# Patient Record
Sex: MALE | Race: WHITE
[De-identification: names, ages, dates, MRNs, and addresses within clinical notes are randomized per-mention and may not be internally consistent; named-entity substitution may affect disease eponyms.]

---

## 2017-04-04 ENCOUNTER — HOSPITAL ENCOUNTER (EMERGENCY)
Dept: HOSPITAL 53 - M ED | Age: 52
LOS: 1 days | Discharge: HOME | End: 2017-04-05
Payer: COMMERCIAL

## 2017-04-04 VITALS — WEIGHT: 315 LBS | BODY MASS INDEX: 41.75 KG/M2 | HEIGHT: 73 IN

## 2017-04-04 VITALS — SYSTOLIC BLOOD PRESSURE: 155 MMHG | DIASTOLIC BLOOD PRESSURE: 83 MMHG

## 2017-04-04 DIAGNOSIS — R07.81: Primary | ICD-10-CM

## 2017-04-04 LAB
ANION GAP SERPL CALC-SCNC: 8 MEQ/L (ref 8–16)
BASOPHILS # BLD AUTO: 0 K/MM3 (ref 0–0.2)
BASOPHILS NFR BLD AUTO: 0.7 % (ref 0–1)
BUN SERPL-MCNC: 11 MG/DL (ref 7–18)
CALCIUM SERPL-MCNC: 8.6 MG/DL (ref 8.5–10.1)
CHLORIDE SERPL-SCNC: 102 MEQ/L (ref 98–107)
CO2 SERPL-SCNC: 28 MEQ/L (ref 21–32)
CREAT SERPL-MCNC: 1.12 MG/DL (ref 0.7–1.3)
EOSINOPHIL # BLD AUTO: 0.5 K/MM3 (ref 0–0.5)
EOSINOPHIL NFR BLD AUTO: 7.8 % (ref 0–3)
ERYTHROCYTE [DISTWIDTH] IN BLOOD BY AUTOMATED COUNT: 13.3 % (ref 11.5–14.5)
GFR SERPL CREATININE-BSD FRML MDRD: > 60 ML/MIN/{1.73_M2} (ref 56–?)
GLUCOSE SERPL-MCNC: 157 MG/DL (ref 70–105)
INR PPP: 0.94
LARGE UNSTAINED CELL #: 0.1 K/MM3 (ref 0–0.4)
LARGE UNSTAINED CELL %: 1 % (ref 0–4)
LYMPHOCYTES # BLD AUTO: 1.6 K/MM3 (ref 1.5–4.5)
LYMPHOCYTES NFR BLD AUTO: 24.4 % (ref 24–44)
MCH RBC QN AUTO: 30.2 PG (ref 27–33)
MCHC RBC AUTO-ENTMCNC: 33.8 G/DL (ref 32–36.5)
MCV RBC AUTO: 89.4 FL (ref 80–96)
MONOCYTES # BLD AUTO: 0.4 K/MM3 (ref 0–0.8)
MONOCYTES NFR BLD AUTO: 6.5 % (ref 0–5)
NEUTROPHILS # BLD AUTO: 3.7 K/MM3 (ref 1.8–7.7)
NEUTROPHILS NFR BLD AUTO: 59.6 % (ref 36–66)
PLATELET # BLD AUTO: 208 K/MM3 (ref 150–450)
POTASSIUM SERPL-SCNC: 3.4 MEQ/L (ref 3.5–5.1)
SODIUM SERPL-SCNC: 138 MEQ/L (ref 136–145)
WBC # BLD AUTO: 6.2 K/MM3 (ref 4–10)

## 2017-04-04 PROCEDURE — 85730 THROMBOPLASTIN TIME PARTIAL: CPT

## 2017-04-04 PROCEDURE — 80048 BASIC METABOLIC PNL TOTAL CA: CPT

## 2017-04-04 PROCEDURE — 85025 COMPLETE CBC W/AUTO DIFF WBC: CPT

## 2017-04-04 PROCEDURE — 71275 CT ANGIOGRAPHY CHEST: CPT

## 2017-04-04 PROCEDURE — 96374 THER/PROPH/DIAG INJ IV PUSH: CPT

## 2017-04-04 PROCEDURE — 99284 EMERGENCY DEPT VISIT MOD MDM: CPT

## 2017-04-04 PROCEDURE — 82550 ASSAY OF CK (CPK): CPT

## 2017-04-04 PROCEDURE — 71020: CPT

## 2017-04-04 PROCEDURE — 82553 CREATINE MB FRACTION: CPT

## 2017-04-04 PROCEDURE — 93005 ELECTROCARDIOGRAM TRACING: CPT

## 2017-04-04 PROCEDURE — 85610 PROTHROMBIN TIME: CPT

## 2017-04-04 NOTE — REPUSA
CT angiogram of the chest

Clinical statement: Chest pain and shortness of breath.

Technique: Multiple axial CT images were obtained from the thoracic inlet through the upper abdomen a
fter a bolus administration of nonionic intravenous contrast. Coronal and sagittal reconstructions we
re also obtained.

Comparison: 1/20/2016.

Findings: The pulmonary arteries are well-opacified with contrast, with no intraluminal filling defec
ts to suggest embolism. The thoracic aorta is unremarkable. Thyroid gland is within normal limits. Th
ere is no thoracic lymphadenopathy. There are no pericardial or pleural effusions. The lungs are lalitha
r. Limited imaging of the upper abdomen is unremarkable. There are no suspicious osseous lesions.

Impression: Unremarkable CT examination of the chest. No evidence of pulmonary embolism.

     Electronically signed by RUDY HARDING MD on 04/04/2017 10:09:54 PM ET

## 2017-04-05 NOTE — REP
PA and lateral chest:

 

Comparisons are 04/24/2016 and 01/20/2016.  The study is also compared with the

chest CT for this same date.

 

The lung fields are clear.  The cardiac size is normal

 

The idalmis, mediastinum, and bony thorax are unremarkable.

 

Impression:

 

Negative PA and lateral chest.  There is no interval change.

 

 

Signed by

Gustabo Choi MD 04/05/2017 08:14 A

## 2017-04-05 NOTE — ECGEPIP
Stationary ECG Study

                           [facility] - ED

                                       

                                       Test Date:    2017

Pat Name:     KATJA ARENAS        Department:   

Patient ID:   [id number]                 Room:         -

Gender:       M                        Technician:   de

:          1965               Requested By: ISABELLA PARISI 

Order Number: [id number]     Reading MD:   Michelle Zapata

                                 Measurements

Intervals                              Axis          

Rate:         106                      P:            57

NM:           155                      QRS:          0

QRSD:         94                       T:            36

QT:           337                                    

QTc:          449                                    

                           Interpretive Statements

SINUS TACHYCARDIA

POSSIBLE LEFT ATRIAL ENLARGEMENT

NONSPECIFIC T-WAVE ABNORMALITY

ABNORMAL RHYTHM ECG

INCREASED RATE 16

Electronically Signed On 2017 21:41:52 EDT by Michelle Zapata

## 2017-04-27 ENCOUNTER — HOSPITAL ENCOUNTER (OUTPATIENT)
Dept: HOSPITAL 53 - M LAB | Age: 52
End: 2017-04-27
Attending: NURSE PRACTITIONER
Payer: COMMERCIAL

## 2017-04-27 DIAGNOSIS — I10: Primary | ICD-10-CM

## 2017-04-27 LAB
ALBUMIN SERPL BCG-MCNC: 3.5 GM/DL (ref 3.2–5.2)
ALBUMIN/GLOB SERPL: 1 {RATIO} (ref 1–1.93)
ALP SERPL-CCNC: 67 U/L (ref 45–117)
ALT SERPL W P-5'-P-CCNC: 36 U/L (ref 12–78)
ANION GAP SERPL CALC-SCNC: 8 MEQ/L (ref 8–16)
AST SERPL-CCNC: 19 U/L (ref 15–37)
BILIRUB SERPL-MCNC: 0.7 MG/DL (ref 0.2–1)
BUN SERPL-MCNC: 12 MG/DL (ref 7–18)
CALCIUM SERPL-MCNC: 8.5 MG/DL (ref 8.5–10.1)
CHLORIDE SERPL-SCNC: 105 MEQ/L (ref 98–107)
CHOLEST SERPL-MCNC: 165 MG/DL (ref ?–200)
CO2 SERPL-SCNC: 28 MEQ/L (ref 21–32)
CREAT SERPL-MCNC: 0.94 MG/DL (ref 0.7–1.3)
ERYTHROCYTE [DISTWIDTH] IN BLOOD BY AUTOMATED COUNT: 13.6 % (ref 11.5–14.5)
GFR SERPL CREATININE-BSD FRML MDRD: > 60 ML/MIN/{1.73_M2} (ref 56–?)
GLUCOSE SERPL-MCNC: 148 MG/DL (ref 70–105)
MCH RBC QN AUTO: 29.8 PG (ref 27–33)
MCHC RBC AUTO-ENTMCNC: 33.1 G/DL (ref 32–36.5)
MCV RBC AUTO: 90.1 FL (ref 80–96)
PLATELET # BLD AUTO: 232 K/MM3 (ref 150–450)
POTASSIUM SERPL-SCNC: 4.1 MEQ/L (ref 3.5–5.1)
PROT SERPL-MCNC: 7 GM/DL (ref 6.4–8.2)
SODIUM SERPL-SCNC: 141 MEQ/L (ref 136–145)
TRIGL SERPL-MCNC: 147 MG/DL (ref ?–150)
WBC # BLD AUTO: 8.3 K/MM3 (ref 4–10)

## 2017-08-01 ENCOUNTER — HOSPITAL ENCOUNTER (EMERGENCY)
Dept: HOSPITAL 53 - M ED | Age: 52
Discharge: HOME | End: 2017-08-01
Payer: COMMERCIAL

## 2017-08-01 VITALS — BODY MASS INDEX: 41.75 KG/M2 | WEIGHT: 315 LBS | HEIGHT: 73 IN

## 2017-08-01 VITALS — SYSTOLIC BLOOD PRESSURE: 104 MMHG | DIASTOLIC BLOOD PRESSURE: 66 MMHG

## 2017-08-01 DIAGNOSIS — E78.4: ICD-10-CM

## 2017-08-01 DIAGNOSIS — E11.9: ICD-10-CM

## 2017-08-01 DIAGNOSIS — K62.5: Primary | ICD-10-CM

## 2017-08-01 DIAGNOSIS — I25.10: ICD-10-CM

## 2017-08-01 DIAGNOSIS — I10: ICD-10-CM

## 2017-08-01 DIAGNOSIS — I25.2: ICD-10-CM

## 2017-08-01 LAB
ALBUMIN SERPL BCG-MCNC: 3.9 GM/DL (ref 3.2–5.2)
ALBUMIN/GLOB SERPL: 1.18 {RATIO} (ref 1–1.93)
ALP SERPL-CCNC: 69 U/L (ref 45–117)
ALT SERPL W P-5'-P-CCNC: 28 U/L (ref 12–78)
ANION GAP SERPL CALC-SCNC: 9 MEQ/L (ref 8–16)
AST SERPL-CCNC: 13 U/L (ref 15–37)
BASOPHILS # BLD AUTO: 0.1 K/MM3 (ref 0–0.2)
BASOPHILS NFR BLD AUTO: 0.9 % (ref 0–1)
BILIRUB CONJ SERPL-MCNC: 0.1 MG/DL (ref 0–0.2)
BILIRUB SERPL-MCNC: 0.5 MG/DL (ref 0.2–1)
BUN SERPL-MCNC: 14 MG/DL (ref 7–18)
CALCIUM SERPL-MCNC: 9.3 MG/DL (ref 8.5–10.1)
CHLORIDE SERPL-SCNC: 108 MEQ/L (ref 98–107)
CO2 SERPL-SCNC: 26 MEQ/L (ref 21–32)
CREAT SERPL-MCNC: 1.07 MG/DL (ref 0.7–1.3)
EOSINOPHIL # BLD AUTO: 0.9 K/MM3 (ref 0–0.5)
EOSINOPHIL NFR BLD AUTO: 6.9 % (ref 0–3)
ERYTHROCYTE [DISTWIDTH] IN BLOOD BY AUTOMATED COUNT: 13.7 % (ref 11.5–14.5)
GFR SERPL CREATININE-BSD FRML MDRD: > 60 ML/MIN/{1.73_M2} (ref 56–?)
GLUCOSE SERPL-MCNC: 145 MG/DL (ref 70–105)
INR PPP: 0.94
LARGE UNSTAINED CELL #: 0.2 K/MM3 (ref 0–0.4)
LARGE UNSTAINED CELL %: 1.1 % (ref 0–4)
LYMPHOCYTES # BLD AUTO: 2 K/MM3 (ref 1.5–4.5)
LYMPHOCYTES NFR BLD AUTO: 13.5 % (ref 24–44)
MCH RBC QN AUTO: 30.4 PG (ref 27–33)
MCHC RBC AUTO-ENTMCNC: 33.3 G/DL (ref 32–36.5)
MCV RBC AUTO: 91.3 FL (ref 80–96)
MONOCYTES # BLD AUTO: 0.6 K/MM3 (ref 0–0.8)
MONOCYTES NFR BLD AUTO: 4.1 % (ref 0–5)
NEUTROPHILS # BLD AUTO: 9.9 K/MM3 (ref 1.8–7.7)
NEUTROPHILS NFR BLD AUTO: 73.4 % (ref 36–66)
PLATELET # BLD AUTO: 323 K/MM3 (ref 150–450)
POTASSIUM SERPL-SCNC: 4.4 MEQ/L (ref 3.5–5.1)
PROT SERPL-MCNC: 7.2 GM/DL (ref 6.4–8.2)
SODIUM SERPL-SCNC: 143 MEQ/L (ref 136–145)
WBC # BLD AUTO: 13.4 K/MM3 (ref 4–10)

## 2017-08-01 PROCEDURE — 86900 BLOOD TYPING SEROLOGIC ABO: CPT

## 2017-08-01 PROCEDURE — 80048 BASIC METABOLIC PNL TOTAL CA: CPT

## 2017-08-01 PROCEDURE — 87507 IADNA-DNA/RNA PROBE TQ 12-25: CPT

## 2017-08-01 PROCEDURE — 85610 PROTHROMBIN TIME: CPT

## 2017-08-01 PROCEDURE — 99283 EMERGENCY DEPT VISIT LOW MDM: CPT

## 2017-08-01 PROCEDURE — 81001 URINALYSIS AUTO W/SCOPE: CPT

## 2017-08-01 PROCEDURE — 83605 ASSAY OF LACTIC ACID: CPT

## 2017-08-01 PROCEDURE — 86901 BLOOD TYPING SEROLOGIC RH(D): CPT

## 2017-08-01 PROCEDURE — 83690 ASSAY OF LIPASE: CPT

## 2017-08-01 PROCEDURE — 74177 CT ABD & PELVIS W/CONTRAST: CPT

## 2017-08-01 PROCEDURE — 80076 HEPATIC FUNCTION PANEL: CPT

## 2017-08-01 PROCEDURE — 85025 COMPLETE CBC W/AUTO DIFF WBC: CPT

## 2017-08-01 PROCEDURE — 85730 THROMBOPLASTIN TIME PARTIAL: CPT

## 2017-08-01 PROCEDURE — 86850 RBC ANTIBODY SCREEN: CPT

## 2017-08-01 NOTE — REP
CT ABDOMEN AND PELVIS WITH CONTRAST:

 

HISTORY: Rectal bleeding.

 

CONTRAST: Isovue-370, 100 mL.

 

The liver, gallbladder, pancreas, spleen, adrenal glands and kidneys are normal

in appearance. There is no mass, adenopathy or free fluid. The visualized lungs

are clear.

 

Normal CT abdomen.

 

CT pelvis: The prostate gland and urinary bladder are normal in appearance. There

is no mass, adenopathy or free fluid. A small right inguinal hernia containing

fat is present. Degenerative change is present in the lumbar spine.

 

IMPRESSION:

 

Small right inguinal hernia.

 

 

Signed by

Aristeo Kumari MD 08/01/2017 06:54 P

## 2017-09-10 ENCOUNTER — HOSPITAL ENCOUNTER (EMERGENCY)
Dept: HOSPITAL 53 - M ED | Age: 52
LOS: 1 days | Discharge: HOME | End: 2017-09-11
Payer: COMMERCIAL

## 2017-09-10 VITALS — HEIGHT: 74 IN | WEIGHT: 315 LBS | BODY MASS INDEX: 40.43 KG/M2

## 2017-09-10 DIAGNOSIS — I10: ICD-10-CM

## 2017-09-10 DIAGNOSIS — I25.10: ICD-10-CM

## 2017-09-10 DIAGNOSIS — E78.4: ICD-10-CM

## 2017-09-10 DIAGNOSIS — R07.81: Primary | ICD-10-CM

## 2017-09-10 DIAGNOSIS — Z87.891: ICD-10-CM

## 2017-09-10 LAB
ADD MANUAL DIFFER: YES
ANION GAP SERPL CALC-SCNC: 5 MEQ/L (ref 8–16)
BASO STIPL BLD QL SMEAR: (no result)
BUN SERPL-MCNC: 10 MG/DL (ref 7–18)
CALCIUM SERPL-MCNC: 8.6 MG/DL (ref 8.5–10.1)
CHLORIDE SERPL-SCNC: 106 MEQ/L (ref 98–107)
CO2 SERPL-SCNC: 29 MEQ/L (ref 21–32)
CREAT SERPL-MCNC: 1.07 MG/DL (ref 0.7–1.3)
EOSINOPHIL NFR BLD MANUAL: 5 % (ref 0–5)
ERYTHROCYTE [DISTWIDTH] IN BLOOD BY AUTOMATED COUNT: 13.7 % (ref 11.5–14.5)
GFR SERPL CREATININE-BSD FRML MDRD: > 60 ML/MIN/{1.73_M2} (ref 56–?)
GLUCOSE SERPL-MCNC: 133 MG/DL (ref 70–105)
HYPOCHROMIA BLD QL SMEAR: (no result)
INR PPP: 0.95
MCH RBC QN AUTO: 26.1 PG (ref 27–33)
MCHC RBC AUTO-ENTMCNC: 31.4 G/DL (ref 32–36.5)
MCV RBC AUTO: 83.3 FL (ref 80–96)
NRBC BLD MANUAL-RTO: 1 % (ref 0–0)
PLATELET # BLD AUTO: 334 K/MM3 (ref 150–450)
POLYCHROMASIA BLD QL SMEAR: (no result)
POTASSIUM SERPL-SCNC: 3.8 MEQ/L (ref 3.5–5.1)
SODIUM SERPL-SCNC: 140 MEQ/L (ref 136–145)
WBC # BLD AUTO: 10.1 K/MM3 (ref 4–10)

## 2017-09-10 PROCEDURE — 82553 CREATINE MB FRACTION: CPT

## 2017-09-10 PROCEDURE — 80048 BASIC METABOLIC PNL TOTAL CA: CPT

## 2017-09-10 PROCEDURE — 71010: CPT

## 2017-09-10 PROCEDURE — 93005 ELECTROCARDIOGRAM TRACING: CPT

## 2017-09-10 PROCEDURE — 71275 CT ANGIOGRAPHY CHEST: CPT

## 2017-09-10 PROCEDURE — 99285 EMERGENCY DEPT VISIT HI MDM: CPT

## 2017-09-10 PROCEDURE — 85610 PROTHROMBIN TIME: CPT

## 2017-09-10 PROCEDURE — 85730 THROMBOPLASTIN TIME PARTIAL: CPT

## 2017-09-10 PROCEDURE — 85025 COMPLETE CBC W/AUTO DIFF WBC: CPT

## 2017-09-10 PROCEDURE — 82550 ASSAY OF CK (CPK): CPT

## 2017-09-10 NOTE — REPUSA
CT angiogram of the chest

Clinical statement: Chest pain and shortness of breath.

Technique: Multiple axial CT images were obtained from the thoracic inlet through the upper abdomen a
fter a bolus administration of nonionic intravenous contrast. Coronal and sagittal reconstructions we
re also obtained.

No comparison is available.

Findings: The pulmonary arteries are well-opacified with contrast, with no intraluminal filling defec
ts to suggest embolism. The thoracic aorta is unremarkable. Thyroid gland is within normal limits. Th
ere is no enlarged thoracic lymphadenopathy, although numerous subcentimeter lymph nodes are seen wit
hin the mediastinum. There are no pericardial or pleural effusions. The lungs are clear. Limited imag
ing of the upper abdomen is unremarkable. There are no suspicious osseous lesions.

Impression: Unremarkable CT examination of the chest. No evidence of pulmonary embolism.

     Electronically signed by RUDY HARDING MD on 09/10/2017 10:07:20 PM ET

## 2017-09-11 VITALS — DIASTOLIC BLOOD PRESSURE: 60 MMHG | SYSTOLIC BLOOD PRESSURE: 128 MMHG

## 2017-09-11 NOTE — ECGEPIP
Stationary ECG Study

                           [facility] - ED

                                       

                                       Test Date:    2017-09-10

Pat Name:     KATJA ARENAS        Department:   

Patient ID:   [id number]                 Room:         -

Gender:       M                        Technician:   de

:          1965               Requested By: ISABELLA PARISI 

Order Number: [id number]     Reading MD:   Asa Sellers

                                 Measurements

Intervals                              Axis          

Rate:         86                       P:            61

CO:           159                      QRS:          14

QRSD:         95                       T:            20

QT:           365                                    

QTc:          439                                    

                           Interpretive Statements

SINUS RHYTHM

SIMILAR TO PRIOR ON SAME DATE

Electronically Signed On 2017 19:29:26 EDT by Asa Sellers

## 2017-09-11 NOTE — REP
Clinical:  Chest pain .

 

Comparison:  04/04/2017 .

 

Findings:

The mediastinum and cardiac silhouette are stable and within normal limits for

portable technique.  The lung fields are clear without acute consolidation,

effusion, or pneumothorax.  Skeletal structures are intact.

 

Impression:

No acute cardiopulmonary process appreciated.

 

 

Signed by

Gregory Chua MD 09/11/2017 07:35 A

## 2017-09-11 NOTE — ECGEPIP
Stationary ECG Study

                           [facility] - ED

                                       

                                       Test Date:    2017-09-10

Pat Name:     KATJA ARENAS        Department:   

Patient ID:   [id number]                 Room:         -

Gender:       M                        Technician:   shree

:          1965               Requested By: ISABELLA PARISI 

Order Number: [id number]     Reading MD:   Asa Sellers

                                 Measurements

Intervals                              Axis          

Rate:         101                      P:            57

WV:           155                      QRS:          16

QRSD:         97                       T:            42

QT:           358                                    

QTc:          464                                    

                           Interpretive Statements

SINUS TACHYCARDIA

 

Electronically Signed On 2017 19:26:03 EDT by Asa Sellers

## 2017-12-11 ENCOUNTER — HOSPITAL ENCOUNTER (OUTPATIENT)
Dept: HOSPITAL 53 - M LAB | Age: 52
End: 2017-12-11
Attending: NURSE PRACTITIONER
Payer: COMMERCIAL

## 2017-12-11 DIAGNOSIS — E11.9: Primary | ICD-10-CM

## 2017-12-11 DIAGNOSIS — E78.2: ICD-10-CM

## 2017-12-11 LAB
ALBUMIN SERPL BCG-MCNC: 3.7 GM/DL (ref 3.2–5.2)
ALBUMIN/GLOB SERPL: 1.06 {RATIO} (ref 1–1.93)
ALP SERPL-CCNC: 73 U/L (ref 45–117)
ALT SERPL W P-5'-P-CCNC: 20 U/L (ref 12–78)
ANION GAP SERPL CALC-SCNC: 8 MEQ/L (ref 8–16)
AST SERPL-CCNC: 11 U/L (ref 7–37)
BILIRUB SERPL-MCNC: 0.5 MG/DL (ref 0.2–1)
BUN SERPL-MCNC: 16 MG/DL (ref 7–18)
CALCIUM SERPL-MCNC: 8.1 MG/DL (ref 8.5–10.1)
CHLORIDE SERPL-SCNC: 109 MEQ/L (ref 98–107)
CHOLEST SERPL-MCNC: 151 MG/DL (ref ?–200)
CO2 SERPL-SCNC: 24 MEQ/L (ref 21–32)
CREAT SERPL-MCNC: 1.04 MG/DL (ref 0.7–1.3)
ERYTHROCYTE [DISTWIDTH] IN BLOOD BY AUTOMATED COUNT: 17.7 % (ref 11.5–14.5)
GFR SERPL CREATININE-BSD FRML MDRD: > 60 ML/MIN/{1.73_M2} (ref 56–?)
GLUCOSE SERPL-MCNC: 160 MG/DL (ref 70–105)
MCH RBC QN AUTO: 20.6 PG (ref 27–33)
MCHC RBC AUTO-ENTMCNC: 27.8 G/DL (ref 32–36.5)
MCV RBC AUTO: 74.1 FL (ref 80–96)
NRBC BLD AUTO-RTO: 0 % (ref 0–0)
PLATELET # BLD AUTO: 305 10^3/UL (ref 150–450)
POTASSIUM SERPL-SCNC: 4.2 MEQ/L (ref 3.5–5.1)
PROT SERPL-MCNC: 7.2 GM/DL (ref 6.4–8.2)
SODIUM SERPL-SCNC: 141 MEQ/L (ref 136–145)
TRIGL SERPL-MCNC: 89 MG/DL (ref ?–150)
WBC # BLD AUTO: 8.6 10^3/UL (ref 4–10)

## 2018-08-21 ENCOUNTER — HOSPITAL ENCOUNTER (OUTPATIENT)
Dept: HOSPITAL 53 - M LAB | Age: 53
End: 2018-08-21
Attending: NURSE PRACTITIONER
Payer: COMMERCIAL

## 2018-08-21 DIAGNOSIS — E11.9: ICD-10-CM

## 2018-08-21 DIAGNOSIS — Z12.5: ICD-10-CM

## 2018-08-21 DIAGNOSIS — E78.2: Primary | ICD-10-CM

## 2018-08-21 LAB
ALBUMIN/GLOBULIN RATIO: 0.97 (ref 1–1.93)
ALBUMIN: 3.5 GM/DL (ref 3.2–5.2)
ALKALINE PHOSPHATASE: 62 U/L (ref 45–117)
ALT SERPL W P-5'-P-CCNC: 20 U/L (ref 12–78)
ANION GAP: 7 MEQ/L (ref 8–16)
AST SERPL-CCNC: 10 U/L (ref 7–37)
BILIRUBIN,TOTAL: 0.6 MG/DL (ref 0.2–1)
BLOOD UREA NITROGEN: 14 MG/DL (ref 7–18)
CALCIUM LEVEL: 8.4 MG/DL (ref 8.5–10.1)
CARBON DIOXIDE LEVEL: 27 MEQ/L (ref 21–32)
CHLORIDE LEVEL: 108 MEQ/L (ref 98–107)
CHOLEST SERPL-MCNC: 144 MG/DL (ref ?–200)
CHOLESTEROL RISK RATIO: 2.44 (ref ?–5)
CREATININE FOR GFR: 1 MG/DL (ref 0.7–1.3)
GFR SERPL CREATININE-BSD FRML MDRD: > 60 ML/MIN/{1.73_M2} (ref 56–?)
GLUCOSE, FASTING: 139 MG/DL (ref 70–100)
HDLC SERPL-MCNC: 59 MG/DL (ref 40–?)
HEMATOCRIT: 36.2 % (ref 42–52)
HEMOGLOBIN: 10.3 G/DL (ref 13.5–17.5)
LDL CHOLESTEROL: 67.6 MG/DL (ref ?–100)
MEAN CORPUSCULAR HEMOGLOBIN: 20.2 PG (ref 27–33)
MEAN CORPUSCULAR HGB CONC: 28.5 G/DL (ref 32–36.5)
MEAN CORPUSCULAR VOLUME: 71 FL (ref 80–96)
NONHDLC SERPL-MCNC: 85 MG/DL
NRBC BLD AUTO-RTO: 0 % (ref 0–0)
PLATELET COUNT, AUTOMATED: 293 10^3/UL (ref 150–450)
POTASSIUM SERUM: 4.3 MEQ/L (ref 3.5–5.1)
PROSTATIC SPECIFIC AG MONITOR: 0.38 NG/ML (ref ?–4)
RED BLOOD COUNT: 5.1 10^6/UL (ref 4.3–6.1)
RED CELL DISTRIBUTION WIDTH: 17.4 % (ref 11.5–14.5)
SODIUM LEVEL: 142 MEQ/L (ref 136–145)
TOTAL PROTEIN: 7.1 GM/DL (ref 6.4–8.2)
TRIGLYCERIDES LEVEL: 87 MG/DL (ref ?–150)
WHITE BLOOD COUNT: 8.5 10^3/UL (ref 4–10)

## 2019-01-07 ENCOUNTER — HOSPITAL ENCOUNTER (OUTPATIENT)
Dept: HOSPITAL 53 - M LAB | Age: 54
End: 2019-01-07
Attending: INTERNAL MEDICINE
Payer: COMMERCIAL

## 2019-01-07 DIAGNOSIS — K62.5: Primary | ICD-10-CM

## 2019-01-07 LAB
BASOPHILS # BLD AUTO: 0.1 10^3/UL (ref 0–0.2)
BASOPHILS NFR BLD AUTO: 0.9 % (ref 0–1)
EOSINOPHIL # BLD AUTO: 0.8 10^3/UL (ref 0–0.5)
EOSINOPHIL NFR BLD AUTO: 8.6 % (ref 0–3)
FERRITIN SERPL-MCNC: 23 NG/ML (ref 26–388)
FOLATE SERPL-MCNC: 15.5 NG/ML
HCT VFR BLD AUTO: 45.3 % (ref 42–52)
HGB BLD-MCNC: 14.4 G/DL (ref 13.5–17.5)
IRON SATN MFR SERPL: 70.8 % (ref 19.7–50)
IRON SERPL-MCNC: 284 UG/DL (ref 65–175)
LYMPHOCYTES # BLD AUTO: 1.8 10^3/UL (ref 1.5–4.5)
LYMPHOCYTES NFR BLD AUTO: 21.1 % (ref 24–44)
MCH RBC QN AUTO: 28.4 PG (ref 27–33)
MCHC RBC AUTO-ENTMCNC: 31.8 G/DL (ref 32–36.5)
MCV RBC AUTO: 89.3 FL (ref 80–96)
MONOCYTES # BLD AUTO: 0.6 10^3/UL (ref 0–0.8)
MONOCYTES NFR BLD AUTO: 6.6 % (ref 0–5)
NEUTROPHILS # BLD AUTO: 5.5 10^3/UL (ref 1.8–7.7)
NEUTROPHILS NFR BLD AUTO: 62.6 % (ref 36–66)
PLATELET # BLD AUTO: 275 10^3/UL (ref 150–450)
RBC # BLD AUTO: 5.07 10^6/UL (ref 4.3–6.1)
TIBC SERPL-MCNC: 401 UG/DL (ref 250–450)
VIT B12 SERPL-MCNC: 434 PG/ML
WBC # BLD AUTO: 8.7 10^3/UL (ref 4–10)

## 2019-01-15 ENCOUNTER — HOSPITAL ENCOUNTER (OUTPATIENT)
Dept: HOSPITAL 53 - M OPP | Age: 54
Discharge: HOME | End: 2019-01-15
Attending: INTERNAL MEDICINE
Payer: COMMERCIAL

## 2019-01-15 VITALS — WEIGHT: 315 LBS | BODY MASS INDEX: 41.75 KG/M2 | HEIGHT: 73 IN

## 2019-01-15 VITALS — SYSTOLIC BLOOD PRESSURE: 155 MMHG | DIASTOLIC BLOOD PRESSURE: 96 MMHG

## 2019-01-15 DIAGNOSIS — Z83.3: ICD-10-CM

## 2019-01-15 DIAGNOSIS — G47.30: ICD-10-CM

## 2019-01-15 DIAGNOSIS — D12.3: ICD-10-CM

## 2019-01-15 DIAGNOSIS — Z79.84: ICD-10-CM

## 2019-01-15 DIAGNOSIS — E11.9: ICD-10-CM

## 2019-01-15 DIAGNOSIS — Z82.49: ICD-10-CM

## 2019-01-15 DIAGNOSIS — Z88.8: ICD-10-CM

## 2019-01-15 DIAGNOSIS — D49.0: ICD-10-CM

## 2019-01-15 DIAGNOSIS — D50.9: ICD-10-CM

## 2019-01-15 DIAGNOSIS — K92.1: Primary | ICD-10-CM

## 2019-01-15 DIAGNOSIS — E78.5: ICD-10-CM

## 2019-01-15 DIAGNOSIS — K29.70: ICD-10-CM

## 2019-01-15 DIAGNOSIS — Z79.899: ICD-10-CM

## 2019-01-15 DIAGNOSIS — I10: ICD-10-CM

## 2019-01-15 DIAGNOSIS — Z98.890: ICD-10-CM

## 2019-01-15 PROCEDURE — 43239 EGD BIOPSY SINGLE/MULTIPLE: CPT

## 2019-01-15 PROCEDURE — 45380 COLONOSCOPY AND BIOPSY: CPT

## 2019-01-15 PROCEDURE — 88305 TISSUE EXAM BY PATHOLOGIST: CPT

## 2019-01-15 PROCEDURE — 45385 COLONOSCOPY W/LESION REMOVAL: CPT

## 2019-01-15 NOTE — ROOR
________________________________________________________________________________

Patient Name: Fredi Bose        Procedure Date: 1/15/2019 11:18 AM

MRN: [id number]                          Account Number: [id number]

YOB: 1965               Age: 53

Room: [facility]                            Gender: Male

Note Status: Finalized                 

________________________________________________________________________________

 

Procedure:           Colonoscopy

Indications:         Hematochezia, Iron deficiency anemia

Providers:           Jeremy Flores MD

Referring MD:        ARMIN VELÁZQUEZ MD

Requesting Provider: 

Medicines:           Monitored Anesthesia Care

Complications:       No immediate complications.

________________________________________________________________________________

Procedure:           Pre-Anesthesia Assessment:

                     - Prior to the procedure, a History and Physical was 

                     performed, and patient medications and allergies were 

                     reviewed. The patient is competent. The risks and 

                     benefits of the procedure and the sedation options and 

                     risks were discussed with the patient. All questions were 

                     answered and informed consent was obtained. Patient 

                     identification and proposed procedure were verified by 

                     the physician, the nurse and the anesthesiologist in the 

                     procedure room. Mental Status Examination: alert and 

                     oriented. Airway Examination: normal oropharyngeal airway 

                     and neck mobility. Respiratory Examination: clear to 

                     auscultation. CV Examination: normal. Prophylactic 

                     Antibiotics: The patient does not require prophylactic 

                     antibiotics. Prior Anticoagulants: The patient has taken 

                     no previous anticoagulant or antiplatelet agents. ASA 

                     Grade Assessment: III - A patient with severe systemic 

                     disease. After reviewing the risks and benefits, the 

                     patient was deemed in satisfactory condition to undergo 

                     the procedure. The anesthesia plan was to use monitored 

                     anesthesia care (MAC). Immediately prior to 

                     administration of medications, the patient was 

                     re-assessed for adequacy to receive sedatives. The heart 

                     rate, respiratory rate, oxygen saturations, blood 

                     pressure, adequacy of pulmonary ventilation, and response 

                     to care were monitored throughout the procedure. The 

                     physical status of the patient was re-assessed after the 

                     procedure.

                     The Colonoscope was introduced through the anus and 

                     advanced to the terminal ileum, with identification of 

                     the appendiceal orifice and IC valve. The colonoscopy was 

                     performed without difficulty. The patient tolerated the 

                     procedure well. The quality of the bowel preparation was 

                     good. The terminal ileum, ileocecal valve, appendiceal 

                     orifice, and rectum were photographed. Scope insertion 

                     time was 4 minutes. Scope withdrawal time was 10 minutes. 

                     The total duration of the procedure was 14 minutes.

                                                                                

Findings:

     The perianal and digital rectal examinations were normal.

     The terminal ileum appeared normal.

     A 10 mm polyp was found in the proximal transverse colon. The polyp was 

     pedunculated. The polyp was removed with a hot snare. Resection and 

     retrieval were complete. Verification of patient identification for the 

     specimen was done by the physician and nurse using the patient's name, 

     birth date and medical record number. Estimated blood loss was minimal.

     An infiltrative and ulcerated partially obstructing large mass was found 

     in the distal transverse colon. The mass was circumferential. The mass 

     measured five cm in length. No bleeding was present. This was biopsied 

     with a cold forceps for histology. Area was tattooed with an injection of 

     Magdalena ink.

     No additional abnormalities were found on retroflexion.

                                                                                

Impression:          - The examined portion of the ileum was normal.

                     - One 10 mm polyp in the proximal transverse colon, 

                     removed with a hot snare. Resected and retrieved.

                     - Likely malignant partially obstructing tumor in the 

                     distal transverse colon. Biopsied. Tattooed.

Recommendation:      - Patient has a contact number available for emergencies. 

                     The signs and symptoms of potential delayed complications 

                     were discussed with the patient. Return to normal 

                     activities tomorrow. Written discharge instructions were 

                     provided to the patient.

                     - Soft diet for 1 day, then advance as tolerated to 

                     resume previous diet.

                     - Continue present medications.

                     - Miralax 1 capful (17 grams) in 8 ounces of water PO 

                     daily.

                     - Post-Procedure Resumption of Antiplatelet Medications: 

                     Restart aspirin today 81 mg PO daily.

                     - Resume Plavix (clopidogrel) at prior dose tomorrow. 

                     Refer to primary physician for further adjustment of 

                     therapy.

                     - Repeat colonoscopy in 1 year per protocol.

                     - Based on the biopsy results you will receive a phone 

                     call from GI clinic in 2-3 weeks to review the pathology 

                     results AND/OR your results will be faxed to your Primary 

                     care physician.

                     - Telephone primary care physician.

                                                                                

 

Jeremy Flores MD

_______________________

Jeremy Flores MD

1/15/2019 12:19:27 PM

This report has been signed electronically.

Number of Addenda: 0

 

Note Initiated On: 1/15/2019 11:18 AM

Estimated Blood Loss:

     Estimated blood loss was minimal.

## 2019-01-15 NOTE — ROOR
________________________________________________________________________________

Patient Name: Fredi Bose        Procedure Date: 1/15/2019 11:17 AM

MRN: [id number]                          Account Number: [id number]

YOB: 1965               Age: 53

Room: [facility]                            Gender: Male

Note Status: Finalized                 

________________________________________________________________________________

 

Procedure:           Upper GI endoscopy

Indications:         Iron deficiency anemia

Providers:           Jeremy Flores MD

Referring MD:        ARMIN VELÁZQUEZ MD

Requesting Provider: 

Medicines:           Monitored Anesthesia Care

Complications:       No immediate complications.

________________________________________________________________________________

Procedure:           Pre-Anesthesia Assessment:

                     - Prior to the procedure, a History and Physical was 

                     performed, and patient medications and allergies were 

                     reviewed. The patient is competent. The risks and 

                     benefits of the procedure and the sedation options and 

                     risks were discussed with the patient. All questions were 

                     answered and informed consent was obtained. Patient 

                     identification and proposed procedure were verified by 

                     the physician, the nurse and the anesthesiologist in the 

                     procedure room. Mental Status Examination: alert and 

                     oriented. Airway Examination: normal oropharyngeal airway 

                     and neck mobility. Respiratory Examination: clear to 

                     auscultation. CV Examination: normal. Prophylactic 

                     Antibiotics: The patient does not require prophylactic 

                     antibiotics. Prior Anticoagulants: The patient has taken 

                     Plavix (clopidogrel), last dose was 7 days prior to 

                     procedure. ASA Grade Assessment: III - A patient with 

                     severe systemic disease. After reviewing the risks and 

                     benefits, the patient was deemed in satisfactory 

                     condition to undergo the procedure. The anesthesia plan 

                     was to use monitored anesthesia care (MAC). Immediately 

                     prior to administration of medications, the patient was 

                     re-assessed for adequacy to receive sedatives. The heart 

                     rate, respiratory rate, oxygen saturations, blood 

                     pressure, adequacy of pulmonary ventilation, and response 

                     to care were monitored throughout the procedure. The 

                     physical status of the patient was re-assessed after the 

                     procedure.

                     The Endoscope was introduced through the mouth, and 

                     advanced to the second part of duodenum. The upper GI 

                     endoscopy was accomplished without difficulty. The 

                     patient tolerated the procedure well.

                                                                                

Findings:

     The examined esophagus was normal.

     Diffuse moderate inflammation characterized by erythema, friability and 

     granularity was found in the gastric antrum. Biopsies were taken with a 

     cold forceps for Helicobacter pylori testing. Verification of patient 

     identification for the specimen was done by the physician and nurse using 

     the patient's name, birth date and medical record number. Estimated blood 

     loss was minimal.

     The duodenal bulb and second portion of the duodenum were normal. 

     Biopsies were taken with a cold forceps for histology.

                                                                                

Impression:          - Normal esophagus.

                     - Gastritis. Biopsied.

                     - Normal duodenal bulb and second portion of the 

                     duodenum. Biopsied.

Recommendation:      - Patient has a contact number available for emergencies. 

                     The signs and symptoms of potential delayed complications 

                     were discussed with the patient. Return to normal 

                     activities tomorrow. Written discharge instructions were 

                     provided to the patient.

                     - Resume previous diet.

                     - Continue present medications.

                     - Await pathology results.

                     - Based on the biopsy results you will receive a phone 

                     call from GI clinic in 2-3 weeks to review the pathology 

                     results AND/OR your results will be faxed to your Primary 

                     care physician.

                     - Return to primary care physician.

                                                                                

 

Jeremy Flores MD

_______________________

Jeremy Flores MD

1/15/2019 12:10:46 PM

This report has been signed electronically.

Number of Addenda: 0

 

Note Initiated On: 1/15/2019 11:17 AM

Estimated Blood Loss:

     Estimated blood loss was minimal.

## 2019-01-22 ENCOUNTER — HOSPITAL ENCOUNTER (OUTPATIENT)
Dept: HOSPITAL 53 - M LAB | Age: 54
End: 2019-01-22
Attending: INTERNAL MEDICINE
Payer: COMMERCIAL

## 2019-01-22 DIAGNOSIS — C18.4: Primary | ICD-10-CM

## 2019-01-22 LAB
ALBUMIN SERPL BCG-MCNC: 3.8 GM/DL (ref 3.2–5.2)
ALT SERPL W P-5'-P-CCNC: 22 U/L (ref 12–78)
BILIRUB CONJ SERPL-MCNC: 0.1 MG/DL (ref 0–0.2)
BILIRUB SERPL-MCNC: 0.4 MG/DL (ref 0.2–1)
BUN SERPL-MCNC: 9 MG/DL (ref 7–18)
CALCIUM SERPL-MCNC: 8.7 MG/DL (ref 8.5–10.1)
CHLORIDE SERPL-SCNC: 107 MEQ/L (ref 98–107)
CO2 SERPL-SCNC: 25 MEQ/L (ref 21–32)
CREAT SERPL-MCNC: 0.89 MG/DL (ref 0.7–1.3)
GFR SERPL CREATININE-BSD FRML MDRD: > 60 ML/MIN/{1.73_M2} (ref 56–?)
GLUCOSE SERPL-MCNC: 129 MG/DL (ref 70–100)
POTASSIUM SERPL-SCNC: 4.2 MEQ/L (ref 3.5–5.1)
PROT SERPL-MCNC: 6.8 GM/DL (ref 6.4–8.2)
SODIUM SERPL-SCNC: 141 MEQ/L (ref 136–145)

## 2019-01-28 ENCOUNTER — HOSPITAL ENCOUNTER (OUTPATIENT)
Dept: HOSPITAL 53 - M RAD | Age: 54
End: 2019-01-28
Attending: INTERNAL MEDICINE
Payer: COMMERCIAL

## 2019-01-28 DIAGNOSIS — I25.10: ICD-10-CM

## 2019-01-28 DIAGNOSIS — C18.4: Primary | ICD-10-CM

## 2019-01-28 DIAGNOSIS — I70.0: ICD-10-CM

## 2019-01-28 DIAGNOSIS — R16.0: ICD-10-CM

## 2019-01-28 PROCEDURE — 74177 CT ABD & PELVIS W/CONTRAST: CPT

## 2019-01-28 PROCEDURE — 71260 CT THORAX DX C+: CPT

## 2019-01-28 NOTE — REP
Clinical:  Malignant neoplasm of the transverse colon.

 

Technique:  Axial contrast enhanced images from the thoracic inlet to the upper

abdomen with coronal and sagittal re-formations.

 

Comparison:  09/10/2017.

 

Findings:

Linear scarring at the lingula again identified.  The bilateral lung fields are

otherwise well aerated and symmetric.  No focal consolidation, nodule or mass

lesion identified.  No pleural effusion.  No pneumothorax.  Tracheobronchial tree

is patent.  No obvious significant axillary, hilar or mediastinal adenopathy

identified.  Atherosclerotic changes to the thoracic aorta and coronary arteries

noted without aortic aneurysm, cardiomegaly or pericardial effusion.  Surrounding

musculoskeletal structures demonstrate age-related changes without focal osseous

abnormality.

 

Impression:

1.  Chronic linear scarring at the lingula.

2.  No acute mediastinal or pleuroparenchymal process.  No evidence for

metastatic disease.

3.  Atherosclerotic changes to the coronary arteries and thoracic aorta.

 

 

Electronically Signed by

Gregory Chua MD 01/28/2019 10:37 A

## 2019-01-28 NOTE — REP
Clinical:  Malignant neoplasm of the transverse colon.

 

Technique:  Axial contrast enhanced images from the lung bases to the pubic

symphysis using oral and 100 ml Isovue 370 intravenous contrast material with

delayed images of the abdomen as well as coronal and sagittal re-formations.

 

Comparison:  08/01/2017.

 

Findings:

Lung bases demonstrate linear scarring at the lingula.  Visualized heart and

pericardium are normal.

 

Hepatomegaly is appreciated without focal hepatic lesion.  Spleen, pancreas,

gallbladder, bilateral adrenal glands and kidneys are normal.  The enteric system

is without obstruction or acute inflammatory process.  There is a short segment

of circumferential mural thickening involving the distal transverse colon (images

47-60) which may be related to the given history of malignancy at the.  No

obvious pericolonic/mesenteric adenopathy is appreciated.

 

Pelvis demonstrates collapsed normal bladder and age appropriate prostate/seminal

vesicles.  No ascites.  No obvious intraperitoneal or retroperitoneal adenopathy.

No free air.  Abdominal aorta and vasculature without aneurysm.  Small fat

containing right inguinal hernia noted.  Musculoskeletal structures demonstrate

age-related changes without focal osseous abnormality.

 

Impression:

1.  Short segment of mural thickening involving the distal transverse colon

possibly consistent with the given history of neoplasm.  No associated

pericolonic/mesenteric infiltration or adenopathy noted. No obvious evidence for

metastatic disease.

2.  No ascites, inflammatory changes, or adenopathy.

3.  Hepatomegaly.

 

 

Electronically Signed by

Gregory Chua MD 01/28/2019 10:34 A

## 2019-02-15 ENCOUNTER — HOSPITAL ENCOUNTER (INPATIENT)
Dept: HOSPITAL 53 - M OR | Age: 54
LOS: 4 days | Discharge: HOME | DRG: 330 | End: 2019-02-19
Attending: SURGERY | Admitting: SURGERY
Payer: COMMERCIAL

## 2019-02-15 VITALS — HEIGHT: 74 IN | BODY MASS INDEX: 40.43 KG/M2 | WEIGHT: 315 LBS

## 2019-02-15 VITALS — SYSTOLIC BLOOD PRESSURE: 164 MMHG | DIASTOLIC BLOOD PRESSURE: 96 MMHG

## 2019-02-15 VITALS — DIASTOLIC BLOOD PRESSURE: 90 MMHG | SYSTOLIC BLOOD PRESSURE: 159 MMHG

## 2019-02-15 DIAGNOSIS — D64.9: ICD-10-CM

## 2019-02-15 DIAGNOSIS — Z95.5: ICD-10-CM

## 2019-02-15 DIAGNOSIS — I25.10: ICD-10-CM

## 2019-02-15 DIAGNOSIS — E78.5: ICD-10-CM

## 2019-02-15 DIAGNOSIS — G47.33: ICD-10-CM

## 2019-02-15 DIAGNOSIS — E11.9: ICD-10-CM

## 2019-02-15 DIAGNOSIS — C18.4: Primary | ICD-10-CM

## 2019-02-15 DIAGNOSIS — Z79.899: ICD-10-CM

## 2019-02-15 DIAGNOSIS — Z79.82: ICD-10-CM

## 2019-02-15 DIAGNOSIS — I10: ICD-10-CM

## 2019-02-15 DIAGNOSIS — Z79.84: ICD-10-CM

## 2019-02-15 DIAGNOSIS — E66.01: ICD-10-CM

## 2019-02-15 PROCEDURE — 0DTL0ZZ RESECTION OF TRANSVERSE COLON, OPEN APPROACH: ICD-10-PCS | Performed by: SURGERY

## 2019-02-15 RX ADMIN — ALVIMOPAN SCH MG: 12 CAPSULE ORAL at 20:53

## 2019-02-15 RX ADMIN — Medication SCH MG: at 20:53

## 2019-02-15 RX ADMIN — SODIUM CHLORIDE, POTASSIUM CHLORIDE, SODIUM LACTATE AND CALCIUM CHLORIDE SCH MLS/HR: 600; 310; 30; 20 INJECTION, SOLUTION INTRAVENOUS at 23:06

## 2019-02-15 RX ADMIN — INSULIN LISPRO SCH UNITS: 100 INJECTION, SOLUTION INTRAVENOUS; SUBCUTANEOUS at 18:53

## 2019-02-15 RX ADMIN — SODIUM CHLORIDE, POTASSIUM CHLORIDE, SODIUM LACTATE AND CALCIUM CHLORIDE SCH MLS/HR: 600; 310; 30; 20 INJECTION, SOLUTION INTRAVENOUS at 22:35

## 2019-02-15 RX ADMIN — DOCUSATE SODIUM,SENNOSIDES SCH TAB: 50; 8.6 TABLET, FILM COATED ORAL at 20:53

## 2019-02-15 RX ADMIN — SODIUM CHLORIDE SCH UNITS: 4.5 INJECTION, SOLUTION INTRAVENOUS at 20:53

## 2019-02-15 RX ADMIN — SODIUM CHLORIDE, POTASSIUM CHLORIDE, SODIUM LACTATE AND CALCIUM CHLORIDE SCH MLS/HR: 600; 310; 30; 20 INJECTION, SOLUTION INTRAVENOUS at 18:54

## 2019-02-15 RX ADMIN — KETOROLAC TROMETHAMINE PRN MG: 30 INJECTION, SOLUTION INTRAMUSCULAR at 22:33

## 2019-02-15 NOTE — POST-OPPD
Postoperative Procedure Note


Date Of Procedure:  Feb 15, 2019


Time Of Procedure:  12:00


PREOPERATIVE DIAGNOSIS: Transverse colon adenocarcinoma





POSTOPERATIVE DIAGNOSIS: Transverse colon adenocarcinoma





FINDINGS: Adenocarcinoma of transverse colon noted. 





PROCEDURE: Laparoscopic transverse colon adenocarcinoma removal. 





SURGEON: Dr. Jean-Baptiste





ASSISTANT: Dr. White and Dr. Ike Lama(PGY-[id number])





ANESTHESIA: General anesthesia





SPECIMENS: Transverse colon with adenocarcinoma





ESTIMATED BLOOD LOSS: 50ml





REPLACED: None





COMPLICATIONS: None











IKE LAMA DO                 Feb 15, 2019 17:13

## 2019-02-15 NOTE — ROOPDOC
[facility] Report Of Operation


Report of Operation


DATE OF PROCEDURE: 2/15/19





PREPROCEDURE DIAGNOSES: Adenocarcinoma of transverse colon.





POSTPROCEDURE DIAGNOSES: Same





PROCEDURE: Laparoscopic Assisted Transverse Colectomy, Takedown of splenic 

flexure. 





SURGEON: Scott Jean-Baptiste MD





ASSISTANT: MD Cindy Coley DO (PGY-1)





Dr. White provided assistance with helping me adequately visualize the intra-

abdominal structures driving the laparoscope, retracting bowel was forming and 

assisting me in the general proceedings of the surgery including resection and 

anastomosis.





ANESTHESIA: General Anesthesia.





ESTIMATED BLOOD LOSS: Approximately 50 mL. 





COMPLICATIONS: none. 





REMARKS: The tumor was found at the distal transverse colon. Extent of fat was 

marked both proximally and distally with Magdalena ink tattoo during his colonos

copy. No metastatic deposits noted on patient's liver surface or peritoneal 

surfaces..





PROCEDURE NOTE: Bulky distal transverse colon malignancy. 





DESCRIPTION OF PROCEDURE: 





Patient was brought to the operating room, transferred to the procedure table. G

[employer] clients each was started. He was placed on a lithotomy position.

Enciso catheter was placed for urine output monitoring. Sequential compression 

device replacement will start extremities were DVT prophylaxis. Patient received

5000 units of heparin subcu properly for chemical DVT prophylaxis. He was given 

Cefotetan 2 g IV and metronidazole 500 mg IV for surgical wound prophylaxis. A 

surgical timeout was performed prior to the start of the surgery





I began by creating a small incision above the umbilicus. A Veress needle was 

inserted and intra-abdominal placement confirmed with saline drop technique. 

Using the same incision a 5 mm port was placed under direct vision by a 

laparoscope. The incision site was inspected for injury and none was found. 

Survey of the abdomen shows mildly enlarged liver but smooth in contour. No 

nodularities or evidence of malignancies found. No peritoneal deposits were 

found. Magdalena ink spackling noted at the omentum. He was placed on a reverse 

Trendelenburg position tilted towards the right side. A 5 mm port was placed 

over the patient's right lower quadrant area and right upper quadrant area under

direct vision. The omentum was retracted superiorly and the tattoo markings 

noted towards the patient's left upper quadrant area on the distal transverse 

colon. Two tattoo markings noted proximally and distally and a hard, bulky 

intraluminal mass palpated in between. A fourth 5 mm port was placed in the 

patient's left lower quadrant area.





The location of the lesion is on the patient's distal transverse colon close to 

the patient's splenic flexure. We began the procedure by dividing the 

gastrocolic ligament starting at the midline going towards the left side to the 

patient's splenic flexure. A lateral to medial approach was then used to open up

the patient's line of Toldt on the left side starting from the mid descending 

colon going upwards towards the splenic flexure. As this to lines of incision 

was approached restarted taking down the splenic flexure attachments to the 

abdominal wall and likewise to the spleen and to the stomach to pull the splenic

flexure towards the medial side. The dissection was continued until the splenic 

flexure was fully taken down. I assessed the area of resection and extended the 

lateral dissection towards the sigmoid colon. Area of distal transection was 

selected. A window was created in the colic mesentery underneath this. The right

lower quadrant 5 mm port was converted to a 12 mm port. Using a echelon 60 mm 

stapler with a green load the descending colon was divided. The course of the 

mesentery was scored and divided with the ligature device. As we approached the 

root of the branch of the left colic artery going towards the splenic flexure 

this was divided with a vascular load of the echelon stapler. In a similar 

fashion the site of transection proximally beyond the tattooed area was chosen 

and marked with hemoclips. I further divided the gastrocolic ligament beyond 

this area to make sure that this portion comes down towards the midline for 

anastomosis. The transverse colon was lifted up the mesentery was taken down 

from the chosen


Transection going towards the left side and eventually the whole mesocolon was 

divided.





At this point the abdomen was deflated the umbilical port site was removed and I

made about a 4 cm periumbilical vertical incision taking this down through the 

subcutaneous tissue with which was deep and open up the fascia under direct 

vision. A fake company 2.0 wound retractor was placed. The colon was delivered into the 

wound and pulled up until we got to the chosen area of transection. The colon 

was divided with the echelon stapler with a green load. We further cleaned up t

he colon off the surrounding tissue including the omentum and mesocolon 

mesentery to be able to pull this up beyond lower abdominal incision. The distal

portion was likewise delivered into the wound and a side-to-side colocolic 

anastomosis was performed using a 75 mm stapler with a blue load. I inspected 

the anastomosis intraluminally for bleeding likewise adequacy of the anastomosis

externally by palpation. The colotomy was then closed transversely with a TA 60 

stapler with a green load. Bleeding points were either cauterized or secured 

with 3-0 silk. The crotch of the anastomosis was secured with another 3-0 silk. 

I palpated for the adequacy or size of the anastomosis and was satisfied with 

this. There was no external bleeding nor leakage of a saw. This was delivered 

back into the wound and the incision was then closed with 1-0 Vicryl in a 

running fashion.





After doing so the abdomen was again insufflated and be resumed laparoscopy. I 

inspected the area over the left upper quadrant for any signs of bleeding and 

likewise made sure that the anastomosis was loose and secure and likewise 

mesentery was not twisted. The omentum was brought back into its anatomic 

location. Last survey of the abdomen did not reveal any bleeding or any 

inadvertent injury.





The abdomen was then deflated, all ports removed. All incisions closed with 

staples including that of the extraction point. Patient was then promptly 

awakened, extubated and brought to recovery room stable. All counts of sponges 

and instruments to determine correct.











SCOTT JEAN-BAPTISTE MD         Feb 15, 2019 17:36

## 2019-02-16 VITALS — DIASTOLIC BLOOD PRESSURE: 75 MMHG | SYSTOLIC BLOOD PRESSURE: 137 MMHG | OXYGEN SATURATION: 94 %

## 2019-02-16 VITALS — DIASTOLIC BLOOD PRESSURE: 65 MMHG | SYSTOLIC BLOOD PRESSURE: 139 MMHG

## 2019-02-16 VITALS — DIASTOLIC BLOOD PRESSURE: 69 MMHG | SYSTOLIC BLOOD PRESSURE: 117 MMHG

## 2019-02-16 VITALS — OXYGEN SATURATION: 91 %

## 2019-02-16 VITALS — SYSTOLIC BLOOD PRESSURE: 132 MMHG | DIASTOLIC BLOOD PRESSURE: 68 MMHG

## 2019-02-16 VITALS — SYSTOLIC BLOOD PRESSURE: 130 MMHG | DIASTOLIC BLOOD PRESSURE: 69 MMHG

## 2019-02-16 LAB
BASOPHILS # BLD AUTO: 0 10^3/UL (ref 0–0.2)
BASOPHILS NFR BLD AUTO: 0.2 % (ref 0–1)
BUN SERPL-MCNC: 10 MG/DL (ref 7–18)
CALCIUM SERPL-MCNC: 8.3 MG/DL (ref 8.5–10.1)
CHLORIDE SERPL-SCNC: 106 MEQ/L (ref 98–107)
CO2 SERPL-SCNC: 25 MEQ/L (ref 21–32)
CREAT SERPL-MCNC: 0.93 MG/DL (ref 0.7–1.3)
EOSINOPHIL # BLD AUTO: 0 10^3/UL (ref 0–0.5)
EOSINOPHIL NFR BLD AUTO: 0 % (ref 0–3)
GFR SERPL CREATININE-BSD FRML MDRD: > 60 ML/MIN/{1.73_M2} (ref 56–?)
GLUCOSE SERPL-MCNC: 178 MG/DL (ref 70–100)
HCT VFR BLD AUTO: 43.1 % (ref 42–52)
HGB BLD-MCNC: 13.8 G/DL (ref 13.5–17.5)
LYMPHOCYTES # BLD AUTO: 0.8 10^3/UL (ref 1.5–4.5)
LYMPHOCYTES NFR BLD AUTO: 7.2 % (ref 24–44)
MCH RBC QN AUTO: 28.7 PG (ref 27–33)
MCHC RBC AUTO-ENTMCNC: 32 G/DL (ref 32–36.5)
MCV RBC AUTO: 89.6 FL (ref 80–96)
MONOCYTES # BLD AUTO: 0.8 10^3/UL (ref 0–0.8)
MONOCYTES NFR BLD AUTO: 6.5 % (ref 0–5)
NEUTROPHILS # BLD AUTO: 9.8 10^3/UL (ref 1.8–7.7)
NEUTROPHILS NFR BLD AUTO: 85.6 % (ref 36–66)
PLATELET # BLD AUTO: 224 10^3/UL (ref 150–450)
POTASSIUM SERPL-SCNC: 4.1 MEQ/L (ref 3.5–5.1)
RBC # BLD AUTO: 4.81 10^6/UL (ref 4.3–6.1)
SODIUM SERPL-SCNC: 140 MEQ/L (ref 136–145)
WBC # BLD AUTO: 11.5 10^3/UL (ref 4–10)

## 2019-02-16 RX ADMIN — Medication SCH MG: at 20:34

## 2019-02-16 RX ADMIN — Medication SCH MG: at 08:18

## 2019-02-16 RX ADMIN — LOSARTAN POTASSIUM SCH MG: 50 TABLET, FILM COATED ORAL at 08:25

## 2019-02-16 RX ADMIN — SODIUM CHLORIDE, POTASSIUM CHLORIDE, SODIUM LACTATE AND CALCIUM CHLORIDE SCH MLS/HR: 600; 310; 30; 20 INJECTION, SOLUTION INTRAVENOUS at 11:26

## 2019-02-16 RX ADMIN — INSULIN LISPRO SCH UNITS: 100 INJECTION, SOLUTION INTRAVENOUS; SUBCUTANEOUS at 08:18

## 2019-02-16 RX ADMIN — EZETIMIBE SCH MG: 10 TABLET ORAL at 08:18

## 2019-02-16 RX ADMIN — KETOROLAC TROMETHAMINE PRN MG: 30 INJECTION, SOLUTION INTRAMUSCULAR at 23:58

## 2019-02-16 RX ADMIN — FEXOFENADINE HYDROCHLORIDE SCH MG: 60 TABLET ORAL at 08:17

## 2019-02-16 RX ADMIN — ASPIRIN SCH MG: 81 TABLET ORAL at 08:18

## 2019-02-16 RX ADMIN — ATORVASTATIN CALCIUM SCH MG: 10 TABLET, FILM COATED ORAL at 08:25

## 2019-02-16 RX ADMIN — KETOROLAC TROMETHAMINE PRN MG: 30 INJECTION, SOLUTION INTRAMUSCULAR at 05:21

## 2019-02-16 RX ADMIN — INSULIN LISPRO SCH UNITS: 100 INJECTION, SOLUTION INTRAVENOUS; SUBCUTANEOUS at 18:14

## 2019-02-16 RX ADMIN — SODIUM CHLORIDE SCH UNITS: 4.5 INJECTION, SOLUTION INTRAVENOUS at 20:35

## 2019-02-16 RX ADMIN — SODIUM CHLORIDE SCH UNITS: 4.5 INJECTION, SOLUTION INTRAVENOUS at 14:38

## 2019-02-16 RX ADMIN — ALVIMOPAN SCH MG: 12 CAPSULE ORAL at 08:18

## 2019-02-16 RX ADMIN — DOCUSATE SODIUM,SENNOSIDES SCH TAB: 50; 8.6 TABLET, FILM COATED ORAL at 20:34

## 2019-02-16 RX ADMIN — INSULIN LISPRO SCH UNITS: 100 INJECTION, SOLUTION INTRAVENOUS; SUBCUTANEOUS at 12:16

## 2019-02-16 RX ADMIN — ALVIMOPAN SCH MG: 12 CAPSULE ORAL at 20:35

## 2019-02-16 RX ADMIN — METOPROLOL SUCCINATE SCH MG: 25 TABLET, EXTENDED RELEASE ORAL at 08:25

## 2019-02-16 RX ADMIN — SODIUM CHLORIDE SCH UNITS: 4.5 INJECTION, SOLUTION INTRAVENOUS at 05:21

## 2019-02-16 RX ADMIN — KETOROLAC TROMETHAMINE PRN MG: 30 INJECTION, SOLUTION INTRAMUSCULAR at 16:11

## 2019-02-16 RX ADMIN — DOCUSATE SODIUM,SENNOSIDES SCH TAB: 50; 8.6 TABLET, FILM COATED ORAL at 08:18

## 2019-02-17 VITALS — OXYGEN SATURATION: 97 %

## 2019-02-17 VITALS — SYSTOLIC BLOOD PRESSURE: 138 MMHG | DIASTOLIC BLOOD PRESSURE: 75 MMHG

## 2019-02-17 VITALS — OXYGEN SATURATION: 95 %

## 2019-02-17 LAB
BASOPHILS # BLD AUTO: 0.1 10^3/UL (ref 0–0.2)
BASOPHILS NFR BLD AUTO: 0.6 % (ref 0–1)
BUN SERPL-MCNC: 8 MG/DL (ref 7–18)
CALCIUM SERPL-MCNC: 9 MG/DL (ref 8.5–10.1)
CHLORIDE SERPL-SCNC: 104 MEQ/L (ref 98–107)
CO2 SERPL-SCNC: 30 MEQ/L (ref 21–32)
CREAT SERPL-MCNC: 0.94 MG/DL (ref 0.7–1.3)
EOSINOPHIL # BLD AUTO: 0.4 10^3/UL (ref 0–0.5)
EOSINOPHIL NFR BLD AUTO: 3.6 % (ref 0–3)
GFR SERPL CREATININE-BSD FRML MDRD: > 60 ML/MIN/{1.73_M2} (ref 56–?)
GLUCOSE SERPL-MCNC: 139 MG/DL (ref 70–100)
HCT VFR BLD AUTO: 43.2 % (ref 42–52)
HGB BLD-MCNC: 13.6 G/DL (ref 13.5–17.5)
LYMPHOCYTES # BLD AUTO: 1.1 10^3/UL (ref 1.5–4.5)
LYMPHOCYTES NFR BLD AUTO: 9.3 % (ref 24–44)
MCH RBC QN AUTO: 29.1 PG (ref 27–33)
MCHC RBC AUTO-ENTMCNC: 31.5 G/DL (ref 32–36.5)
MCV RBC AUTO: 92.3 FL (ref 80–96)
MONOCYTES # BLD AUTO: 0.8 10^3/UL (ref 0–0.8)
MONOCYTES NFR BLD AUTO: 7.2 % (ref 0–5)
NEUTROPHILS # BLD AUTO: 9.2 10^3/UL (ref 1.8–7.7)
NEUTROPHILS NFR BLD AUTO: 78.8 % (ref 36–66)
PLATELET # BLD AUTO: 212 10^3/UL (ref 150–450)
POTASSIUM SERPL-SCNC: 3.8 MEQ/L (ref 3.5–5.1)
RBC # BLD AUTO: 4.68 10^6/UL (ref 4.3–6.1)
SODIUM SERPL-SCNC: 141 MEQ/L (ref 136–145)
WBC # BLD AUTO: 11.7 10^3/UL (ref 4–10)

## 2019-02-17 RX ADMIN — ALVIMOPAN SCH MG: 12 CAPSULE ORAL at 07:50

## 2019-02-17 RX ADMIN — Medication SCH MG: at 20:05

## 2019-02-17 RX ADMIN — INSULIN LISPRO SCH UNITS: 100 INJECTION, SOLUTION INTRAVENOUS; SUBCUTANEOUS at 17:36

## 2019-02-17 RX ADMIN — INSULIN LISPRO SCH UNITS: 100 INJECTION, SOLUTION INTRAVENOUS; SUBCUTANEOUS at 07:49

## 2019-02-17 RX ADMIN — ALVIMOPAN SCH MG: 12 CAPSULE ORAL at 20:05

## 2019-02-17 RX ADMIN — LOSARTAN POTASSIUM SCH MG: 50 TABLET, FILM COATED ORAL at 07:51

## 2019-02-17 RX ADMIN — DOCUSATE SODIUM,SENNOSIDES SCH TAB: 50; 8.6 TABLET, FILM COATED ORAL at 20:05

## 2019-02-17 RX ADMIN — Medication SCH MG: at 07:49

## 2019-02-17 RX ADMIN — ATORVASTATIN CALCIUM SCH MG: 10 TABLET, FILM COATED ORAL at 07:50

## 2019-02-17 RX ADMIN — SITAGLIPTIN SCH MG: 50 TABLET, FILM COATED ORAL at 14:58

## 2019-02-17 RX ADMIN — ENOXAPARIN SODIUM SCH MG: 40 INJECTION SUBCUTANEOUS at 14:58

## 2019-02-17 RX ADMIN — ASPIRIN SCH MG: 81 TABLET ORAL at 07:50

## 2019-02-17 RX ADMIN — EZETIMIBE SCH MG: 10 TABLET ORAL at 07:50

## 2019-02-17 RX ADMIN — FEXOFENADINE HYDROCHLORIDE SCH MG: 60 TABLET ORAL at 07:49

## 2019-02-17 RX ADMIN — METOPROLOL SUCCINATE SCH MG: 25 TABLET, EXTENDED RELEASE ORAL at 07:50

## 2019-02-17 RX ADMIN — SODIUM CHLORIDE SCH UNITS: 4.5 INJECTION, SOLUTION INTRAVENOUS at 05:30

## 2019-02-17 RX ADMIN — DOCUSATE SODIUM,SENNOSIDES SCH TAB: 50; 8.6 TABLET, FILM COATED ORAL at 07:50

## 2019-02-17 RX ADMIN — METFORMIN HYDROCHLORIDE SCH MG: 1000 TABLET ORAL at 17:35

## 2019-02-17 RX ADMIN — INSULIN LISPRO SCH UNITS: 100 INJECTION, SOLUTION INTRAVENOUS; SUBCUTANEOUS at 12:33

## 2019-02-17 NOTE — IPN
DATE:  02/16/2019

 

HISTORY:

Patient is postoperative day 1 from a laparoscopic resection of his transverse

colon.  He has been taking clear liquids which he has tolerated well.  His urine

output is good and he has a Enciso catheter in place.  He is having some

discomfort particularly with movement but has been taking very little in the way

of pain medications.  He reports some flatus but no bowel movement yet.

 

Vital signs show that he has been afebrile since surgery.  His pulse is in the

100-114 range.  Blood pressure is good with a normal room air oxygen saturation.

 

Intake and output shows that yesterday he had 5100 in with 800 recorded out.  He

has 1000 mL of urine recorded out so far today and his Enciso catheter bag has

perhaps another 1000.

 

PHYSICAL EXAMINATION:

The patient is a burly man lying quietly on the hospital bed.  He is a man of 
few

words.  Heart exam shows a regular rhythm.  Abdomen is somewhat protuberant and

obese.  He has multiple small dressings on.  There is a midline dressing which

has some staining of the lower quarter of the bandage.  He does have bowel 
sounds

present.  There is some tympany to percussion in the upper abdomen.  He has some

mild expected postop tenderness on palpation.

 

Laboratory studies show a CBC with a white count of 12, hemoglobin of 14,

hematocrit of 43 and platelet count of 224,000.  Differential count shows 86%

neutrophils, 7% lymphocytes and 7% monocytes.  Chemistry profile shows normal

electrolytes, BUN, creatinine and a glucose of 178.

 

IMPRESSION:

The patient is doing well one day postop from a laparoscopic transverse 
colectomy

for carcinoma.  He is tolerating clear liquids well.  He has little discomfort.

 

PLAN:

I will discontinue his Enciso and saline lock his IV.  He will be advanced to 
full

liquids and he was encouraged to be up out of bed.  We will check his labs again

in the morning.

VAISHNAVI

## 2019-02-18 VITALS — OXYGEN SATURATION: 96 %

## 2019-02-18 VITALS — SYSTOLIC BLOOD PRESSURE: 129 MMHG | DIASTOLIC BLOOD PRESSURE: 60 MMHG

## 2019-02-18 VITALS — DIASTOLIC BLOOD PRESSURE: 65 MMHG | SYSTOLIC BLOOD PRESSURE: 130 MMHG

## 2019-02-18 VITALS — SYSTOLIC BLOOD PRESSURE: 146 MMHG | DIASTOLIC BLOOD PRESSURE: 77 MMHG

## 2019-02-18 LAB
BASOPHILS # BLD AUTO: 0.1 10^3/UL (ref 0–0.2)
BASOPHILS NFR BLD AUTO: 0.6 % (ref 0–1)
BUN SERPL-MCNC: 9 MG/DL (ref 7–18)
CALCIUM SERPL-MCNC: 8.5 MG/DL (ref 8.5–10.1)
CHLORIDE SERPL-SCNC: 106 MEQ/L (ref 98–107)
CO2 SERPL-SCNC: 30 MEQ/L (ref 21–32)
CREAT SERPL-MCNC: 0.93 MG/DL (ref 0.7–1.3)
EOSINOPHIL # BLD AUTO: 1 10^3/UL (ref 0–0.5)
EOSINOPHIL NFR BLD AUTO: 9.3 % (ref 0–3)
GFR SERPL CREATININE-BSD FRML MDRD: > 60 ML/MIN/{1.73_M2} (ref 56–?)
GLUCOSE SERPL-MCNC: 165 MG/DL (ref 70–100)
HCT VFR BLD AUTO: 39.7 % (ref 42–52)
HGB BLD-MCNC: 12.8 G/DL (ref 13.5–17.5)
LYMPHOCYTES # BLD AUTO: 1.4 10^3/UL (ref 1.5–4.5)
LYMPHOCYTES NFR BLD AUTO: 12.2 % (ref 24–44)
MCH RBC QN AUTO: 28.9 PG (ref 27–33)
MCHC RBC AUTO-ENTMCNC: 32.2 G/DL (ref 32–36.5)
MCV RBC AUTO: 89.6 FL (ref 80–96)
MONOCYTES # BLD AUTO: 0.7 10^3/UL (ref 0–0.8)
MONOCYTES NFR BLD AUTO: 6.7 % (ref 0–5)
NEUTROPHILS # BLD AUTO: 7.9 10^3/UL (ref 1.8–7.7)
NEUTROPHILS NFR BLD AUTO: 70.9 % (ref 36–66)
PLATELET # BLD AUTO: 214 10^3/UL (ref 150–450)
POTASSIUM SERPL-SCNC: 3.6 MEQ/L (ref 3.5–5.1)
RBC # BLD AUTO: 4.43 10^6/UL (ref 4.3–6.1)
SODIUM SERPL-SCNC: 141 MEQ/L (ref 136–145)
WBC # BLD AUTO: 11.1 10^3/UL (ref 4–10)

## 2019-02-18 RX ADMIN — METFORMIN HYDROCHLORIDE SCH MG: 1000 TABLET ORAL at 09:12

## 2019-02-18 RX ADMIN — ALVIMOPAN SCH MG: 12 CAPSULE ORAL at 09:13

## 2019-02-18 RX ADMIN — ATORVASTATIN CALCIUM SCH MG: 10 TABLET, FILM COATED ORAL at 09:13

## 2019-02-18 RX ADMIN — CLOPIDOGREL BISULFATE SCH MG: 75 TABLET ORAL at 10:55

## 2019-02-18 RX ADMIN — ASPIRIN SCH MG: 81 TABLET ORAL at 09:14

## 2019-02-18 RX ADMIN — DOCUSATE SODIUM,SENNOSIDES SCH TAB: 50; 8.6 TABLET, FILM COATED ORAL at 20:29

## 2019-02-18 RX ADMIN — ENOXAPARIN SODIUM SCH MG: 40 INJECTION SUBCUTANEOUS at 09:12

## 2019-02-18 RX ADMIN — DOCUSATE SODIUM,SENNOSIDES SCH TAB: 50; 8.6 TABLET, FILM COATED ORAL at 09:13

## 2019-02-18 RX ADMIN — EZETIMIBE SCH MG: 10 TABLET ORAL at 09:14

## 2019-02-18 RX ADMIN — INSULIN LISPRO SCH UNITS: 100 INJECTION, SOLUTION INTRAVENOUS; SUBCUTANEOUS at 09:14

## 2019-02-18 RX ADMIN — Medication SCH MG: at 20:31

## 2019-02-18 RX ADMIN — FEXOFENADINE HYDROCHLORIDE SCH MG: 60 TABLET ORAL at 09:14

## 2019-02-18 RX ADMIN — INSULIN LISPRO SCH UNITS: 100 INJECTION, SOLUTION INTRAVENOUS; SUBCUTANEOUS at 18:26

## 2019-02-18 RX ADMIN — LOSARTAN POTASSIUM SCH MG: 50 TABLET, FILM COATED ORAL at 09:13

## 2019-02-18 RX ADMIN — INSULIN LISPRO SCH UNITS: 100 INJECTION, SOLUTION INTRAVENOUS; SUBCUTANEOUS at 12:26

## 2019-02-18 RX ADMIN — SITAGLIPTIN SCH MG: 50 TABLET, FILM COATED ORAL at 09:14

## 2019-02-18 RX ADMIN — METOPROLOL SUCCINATE SCH MG: 25 TABLET, EXTENDED RELEASE ORAL at 09:12

## 2019-02-18 RX ADMIN — METFORMIN HYDROCHLORIDE SCH MG: 1000 TABLET ORAL at 18:26

## 2019-02-18 RX ADMIN — Medication SCH MG: at 09:13

## 2019-02-19 VITALS — DIASTOLIC BLOOD PRESSURE: 73 MMHG | SYSTOLIC BLOOD PRESSURE: 135 MMHG

## 2019-02-19 LAB
HCT VFR BLD AUTO: 40.2 % (ref 42–52)
HGB BLD-MCNC: 13 G/DL (ref 13.5–17.5)
MCH RBC QN AUTO: 28.8 PG (ref 27–33)
MCHC RBC AUTO-ENTMCNC: 32.3 G/DL (ref 32–36.5)
MCV RBC AUTO: 89.1 FL (ref 80–96)
PLATELET # BLD AUTO: 233 10^3/UL (ref 150–450)
RBC # BLD AUTO: 4.51 10^6/UL (ref 4.3–6.1)
WBC # BLD AUTO: 10.8 10^3/UL (ref 4–10)

## 2019-02-19 RX ADMIN — INSULIN LISPRO SCH UNITS: 100 INJECTION, SOLUTION INTRAVENOUS; SUBCUTANEOUS at 09:08

## 2019-02-19 RX ADMIN — SITAGLIPTIN SCH MG: 50 TABLET, FILM COATED ORAL at 09:06

## 2019-02-19 RX ADMIN — ENOXAPARIN SODIUM SCH MG: 40 INJECTION SUBCUTANEOUS at 09:06

## 2019-02-19 RX ADMIN — METOPROLOL SUCCINATE SCH MG: 25 TABLET, EXTENDED RELEASE ORAL at 09:08

## 2019-02-19 RX ADMIN — ASPIRIN SCH MG: 81 TABLET ORAL at 09:07

## 2019-02-19 RX ADMIN — CLOPIDOGREL BISULFATE SCH MG: 75 TABLET ORAL at 09:07

## 2019-02-19 RX ADMIN — EZETIMIBE SCH MG: 10 TABLET ORAL at 09:08

## 2019-02-19 RX ADMIN — METFORMIN HYDROCHLORIDE SCH MG: 1000 TABLET ORAL at 09:07

## 2019-02-19 RX ADMIN — Medication SCH MG: at 09:06

## 2019-02-19 RX ADMIN — FEXOFENADINE HYDROCHLORIDE SCH MG: 60 TABLET ORAL at 09:06

## 2019-02-19 RX ADMIN — DOCUSATE SODIUM,SENNOSIDES SCH TAB: 50; 8.6 TABLET, FILM COATED ORAL at 09:07

## 2019-02-19 RX ADMIN — LOSARTAN POTASSIUM SCH MG: 50 TABLET, FILM COATED ORAL at 09:07

## 2019-02-19 RX ADMIN — ATORVASTATIN CALCIUM SCH MG: 10 TABLET, FILM COATED ORAL at 09:07

## 2019-02-19 NOTE — IPN
DATE:  02/17/2019

 

HISTORY:  The patient is now two days postoperative from a transverse colon

resection for cancer.  He was advanced to full liquids yesterday and he tolerated

these well so he was advanced to a regular diet early this afternoon.  He has had

several bowel movements recorded.  These have been loose.  He has not noticed any

increase in his pain.

 

VITAL SIGNS:  Show that he has been afebrile over the past 24 hours.  His pulse

has ranged from 105 to 114.  Blood pressure is good and his room air oxygen

saturation is normal.

 

Intake and output yesterday:  He had 2600 in with 1975 out.  Today he has had

1430 in so far with one liter of urine output.

 

PHYSICAL EXAMINATION:

GENERAL:  Patient is lying quietly on the hospital bed.  He is alert and

oriented.

HEART:  Heart exam shows a regular rate and rhythm.

ABDOMEN:  The abdomen is somewhat protuberant and obese.  He has active bowel

sounds present.  Palpation reveals the abdomen to be soft with only expected mild

tenderness present.

 

LABORATORIES:  Laboratory studies today showed normal electrolytes with BUN of 8,

creatinine 0.9 and a glucose of 139.  The complete blood count (CBC) showed a

white count of 12, hemoglobin 14, hematocrit 43 and platelet count of 212,000.

Differential count showed 79% neutrophils, 9% lymphocytes and 7% monocytes.

 

IMPRESSION:

The patient is doing very well now two days postoperative from his transverse

colectomy.  He was advanced to regular diet early this afternoon and has had a

sandwich without any difficulty.  He has had no nausea or vomiting.

 

PLAN:

The patient will remain on a regular diet.  I will reorder his metformin to start

this evening and the Januvia also to start today.  I have converted his heparin

to Lovenox to reduce the number of injections.  The patient may well be ready for

discharge tomorrow, but Dr. Jean-Baptiste can make that call when he comes in to see

the patient tomorrow.

## 2019-02-21 NOTE — DS.PDOC
Discharge Summary


General


Date of Admission


Feb 15, 2019 at 09:18


Date of Discharge


02/19/2019


Attending Physician:  TINO MCGOVERN MD





Discharge Summary


PROCEDURES PERFORMED DURING STAY: Laparoscopic assisted transverse colectomy 

with anastomosis, takedown of splenic flexure.





ADMITTING DIAGNOSES: 


1. Malignancy transverse colon


2. Morbid obesity


3. Diabetes


4. Coronary artery disease.





DISCHARGE DIAGNOSES:


1. Malignancy transverse colon status post transverse colectomy (final pathology

pending)


2. Morbid obesity


3. Diabetes


4. Coronary artery disease.





COMPLICATIONS/CHIEF COMPLAINT: Transverse Colon Adenocarcinoma.





HISTORY OF PRESENT ILLNESS: 





Patient had a colonoscopy done for evaluation of intermittent rectal bleeding 

and was found to have a malignant-appearing mass at the distal transverse colon.

Biopsy reveals adenocarcinoma. Preoperative workup which includes a CT scan of 

the chest abdomen and pelvis did not reveal any noticeable metastasis. He was 

seen in my clinic and counseled for surgery. He is brought today for resection 

of planned laparoscopic transverse colectomy.





HOSPITAL COURSE: Patient underwent laparoscopic transverse colectomy on 

02/15/2019. He did well perioperatively. He was admitted to regular floors with 

cardiorespiratory monitoring. He was immediately started on clear liquids. He 

was passing flatus by postop day 1 he was advanced to a full liquid diet which 

she tolerated. He was mildly tachycardic throughout his postoperative course 

which is at part with this preoperative state. He is on metoprolol 25 mg daily. 

His aspirin has been continued perioperatively. His Plavix has been held 7 days 

prior to the procedure. This was restarted at about postop day 3 after we 

determined he has been hemodynamically stable and his hemoglobin and hematocrit 

has been stable. At postop day 2 he was advanced to regular diet. He has been 

having 2 or 3 loose stools daily. His Entereg was discontinued by then. At the 

of discharge he continues to have loose stools but patient reports this is 

getting more formed in consistency. He is denying any abdominal discomfort. The 

is tolerating regular diet with a good appetite. His blood sugars have been well

controlled perioperatively. No postoperative complications noted throughout the 

hospital postoperative course.





DISCHARGE MEDICATIONS: Please see below.


 


ALLERGIES: Please see below.





PHYSICAL EXAMINATION ON DISCHARGE:


VITAL SIGNS: Please see below.


GENERAL: Comfortable


HEENT: Pink palpebral conjunctiva. Anicteric sclerae. Lips and mucosa are moist.


NECK: Supple neck. Short. No obvious jugular venous distention.


CARDIOVASCULAR EXAMINATION: Regular heart rate and rhythm. No murmurs.


RESPIRATORY EXAMINATION: Clear breath sounds to auscultation bilaterally.


ABDOMINAL EXAMINATION: Morbidly obese, very protuberant and rounded but 

nondistended. Periumbilical midline incision with staples intact. Mild bruising 

below the umbilicus but otherwise no noticeable subcutaneous collection. No e

rythema or drainage. There are 3 other ports site incisions with staples are 

healing accordingly. Abdomen is nontender to palpation in all 4 quadrants.


EXTREMITIES: No edema


SKIN: No skin rashes


NEUROLOGICAL EXAMINATION: Awake, alert, oriented





LABORATORY DATA: Please see below.





IMAGING: None





PROGNOSIS: Good. Pathology remains pending at the time of discharge.





ACTIVITY: Light activity 2 weeks..





DIET: As tolerated.





DISCHARGE PLAN: Patient will be discharged home. He'll follow-up with me next 

week for removal of staples. I will review the pathology results with him and 

subsequent recommendation on his follow-up.





DISPOSITION: .





DISCHARGE INSTRUCTIONS:


1. As above


2. May shower. Pat incisions until dry. May either leave the incision open to 

air or cover this with a light gauze dressing and change as needed


3. Follow-up with me as scheduled next Thursday.





ITEMS TO FOLLOWUP ON ON OUTPATIENT:


1. Final pathology result





DISCHARGE CONDITION: Stable.





TIME SPENT ON DISCHARGE: Greater than 30 minutes.





Vital Signs/I&Os





Vital Signs








  Date Time  Temp Pulse Resp B/P (MAP) Pulse Ox O2 Delivery O2 Flow Rate FiO2


 


2/19/19 09:08  90  135/73    


 


2/19/19 06:00 97.0  16  98   


 


2/18/19 23:40      Room Air  


 


2/17/19 14:00       2.0 














I&O- Last 24 Hours up to 6 AM 


 


 2/19/19





 06:00


 


Intake Total 2810 ml


 


Output Total 300 ml


 


Balance 2510 ml











Laboratory Data


Labs 24H


Laboratory Tests 2


2/18/19 12:01: Bedside Glucose (Misc Panel) 138H


2/18/19 16:36: Bedside Glucose (Misc Panel) 150H


2/18/19 20:25: Bedside Glucose (Misc Panel) 160H


2/19/19 05:49: Nucleated Red Blood Cells % (auto) 0.0


CBC/BMP


Laboratory Tests


2/19/19 05:49








Red Blood Count 4.51, Mean Corpuscular Volume 89.1, Mean Corpuscular Hemoglobin 

28.8, Mean Corpuscular Hemoglobin Concent 32.3, Red Cell Distribution Width 13.7


FSBS





Laboratory Tests








Test


 2/18/19


12:01 2/18/19


16:36 2/18/19


20:25 Range/Units


 


 


Bedside Glucose (Misc Panel) 138 150 160   MG/DL











Discharge Medications


Scheduled


Ascorbic Acid (Vitamin C) 500 Mg Tab, 500 MG PO DAILY, (Reported)


Aspirin (Aspirin 81) 81 Mg Tab, 81 MG PO DAILY, (Reported)


Atorvastatin Calcium (Atorvastatin Calcium) 10 Mg Tab, 10 MG PO DAILY, 

(Reported)


Calcium/Vitamin D (Calcium 500/Vitamin D 500-125 mg-Unit) 1 Tab Tab, 500 MG PO 

BID, (Reported)


Clopidogrel Bisulfate (Plavix) 75 Mg Tab, 75 MG PO DAILY, (Reported)


Dapagliflozin Propanediol (Farxiga) 10 Mg Tab, 10 MG PO QAM, (Reported)


Ezetimibe (Zetia) 10 Mg Tab, 10 MG PO DAILY, (Reported)


Fexofenadine Hydrochloride (Allegra Allergy) 180 Mg Tab, 180 MG PO DAILY, 

(Reported)


Losartan Potassium (Losartan Potassium) 100 Mg Tab, 50 MG PO DAILY, (Reported)


Metformin Hydrochloride (Metformin HCl) 1,000 Mg Tab, 1,000 MG PO BID, (Rep

orted)


Metoprolol Succinate (Toprol Xl) 25 Mg Tab, 25 MG PO DAILY, (Reported)


Multivitamins (Multivitamin Adults) 1 Tab Tab, 1 TAB PO DAILY, (Reported)


Sitagliptin Phosphate (Januvia) 100 Mg Tab, 100 MG PO DAILY, (Reported)





Scheduled PRN


Acetaminophen/Hydrocodone (Norco, Anexsia 5/325) 1 Tab Tab, 1-2 TAB PO Q4HP PRN 

for MODERATE PAIN (PS 5-7)


Albuterol Sulfate (Proair Hfa) 108 Mcg/Act Aer, INH PRN PRN for SHORTNESS OF 

BREATH, (Reported)





Allergies


Coded Allergies:  


     Quinapril (Verified  Allergy, Intermediate, shortness of breath, 1/3/19)











TINO MCGOVERN MD         Feb 19, 2019 09:43

## 2019-02-21 NOTE — IPNPDOC
Subjective


General


Date/Time Seen


The patient was seen on 2/18/19





Subject


Chief Complaint/History


The patient is a 53-year-old male admitted with a reason for visit of Transverse

Colon Adenocarcinoma. 


Patient had laparoscopic transverse colectomy last Friday. He did well over the 

weekend. He is having bowel movements now and he was started on regular diet by 

Dr. White yesterday. So far he has had 2 solid meals. He still feels somewhat 

distended but otherwise does not have any severe abdominal discomfort. He 

reports having diarrhea.





Current Medications


Current Medications





Current Medications


Acetaminophen (Tylenol Tab) 650 mg Q4HP  PRN PO MILD PAIN or TEMP > 101;  Start 

2/15/19 at 17:30;  Stop 2/19/19 at 11:15;  Status DC


Acetaminophen/ Hydrocodone Bitart (Norco, Anexsia 5/325) 1 tab Q4HP  PRN PO 

MODERATE PAIN (PS 5-7);  Start 2/15/19 at 17:30;  Stop 2/19/19 at 11:15;  Status

DC


Acetaminophen/ Hydrocodone Bitart (Norco, Anexsia 5/325) 2 tab Q6HP  PRN PO 

SEVERE PAIN (PS 8-10);  Start 2/15/19 at 17:30;  Stop 2/16/19 at 14:52;  Status 

DC


Alvimopan (Entereg) 12 mg BID PO  Last administered on 2/18/19at 09:13;  Start 

2/15/19 at 21:00;  Stop 2/18/19 at 10:29;  Status DC


Aspirin (Ecotrin) 81 mg DAILY PO  Last administered on 2/19/19at 09:07;  Start 

2/16/19 at 09:00;  Stop 2/19/19 at 11:15;  Status DC


Atorvastatin Calcium (Lipitor) 10 mg DAILY PO  Last administered on 2/19/19at 

09:07;  Start 2/16/19 at 09:00;  Stop 2/19/19 at 11:15;  Status DC


Calcium/Vitamin D (Oscal D) 500 mg BID PO  Last administered on 2/19/19at 09:06;

 Start 2/15/19 at 21:00;  Stop 2/19/19 at 11:15;  Status DC


Clopidogrel Bisulfate (PLAVix) 75 mg DAILY PO  Last administered on 2/19/19at 

09:07;  Start 2/18/19 at 09:00;  Stop 2/19/19 at 11:15;  Status DC


Dextrose (Dextrose 50%) 25 ml ASDIRECTED  PRN IV SEE LABEL COMMENTS;  Start 

2/15/19 at 17:30;  Stop 2/19/19 at 11:15;  Status DC


Enoxaparin Sodium (Lovenox) 40 mg DAILY SC  Last administered on 2/19/19at 

09:06;  Start 2/17/19 at 13:30;  Stop 2/19/19 at 11:15;  Status DC


EZETIMIBE (Zetia) 10 mg DAILY PO  Last administered on 2/19/19at 09:08;  Start 

2/16/19 at 09:00;  Stop 2/19/19 at 11:15;  Status DC


Fentanyl Citrate (Sublimaze) 25 mcg Q5MP  PRN IV MODERATE PAIN (PS 4-7);  Start 

2/15/19 at 18:15;  Stop 2/15/19 at 19:15;  Status DC


Fexofenadine HCl (Allegra) 180 mg DAILY PO  Last administered on 2/19/19at 

09:06;  Start 2/16/19 at 09:00;  Stop 2/19/19 at 11:15;  Status DC


Glucagon (Glucagon) 1 mg ASDIRECTED  PRN SC SEE LABEL COMMENTS;  Start 2/15/19 

at 17:30;  Stop 2/19/19 at 11:15;  Status DC


Glucose (Glucose) 16 GM ASDIRECTED  PRN PO SEE LABEL COMMENTS;  Start 2/15/19 at

17:30;  Stop 2/19/19 at 11:15;  Status DC


Heparin Sodium (Porcine) (Heparin) 5,000 units Q8H SC  Last administered on 

2/17/19at 05:30;  Start 2/15/19 at 22:00;  Stop 2/17/19 at 13:20;  Status DC


Hydromorphone HCl (Dilaudid) 0.2 mg Q15MP  PRN IV MODERATE/SEVERE PAIN (PS 5-

10);  Start 2/15/19 at 18:15;  Stop 2/16/19 at 14:52;  Status DC


Insulin Human Lispro (HumaLOG INSULIN) SEE PROTOCOL TABLE AC SC  Last 

administered on 2/19/19at 09:08;  Start 2/15/19 at 17:30;  Stop 2/19/19 at 

11:15;  Status DC


Ketorolac Tromethamine (ToRADol) 30 mg Q6HP  PRN IV MILD/MODERATE PAIN (PS 1-7) 

Last administered on 2/16/19at 23:58;  Start 2/15/19 at 17:30;  Stop 2/19/19 at 

11:15;  Status DC


Lactated Ringer's 1,000 ml @  100 mls/hr Q10H IV  Last administered on 2/16/19at

11:26;  Start 2/15/19 at 17:25;  Stop 2/16/19 at 16:45;  Status DC


Losartan Potassium (Cozaar) 50 mg DAILY PO  Last administered on 2/19/19at 

09:07;  Start 2/16/19 at 09:00;  Stop 2/19/19 at 11:15;  Status DC


Metformin HCl (Glucophage) 1,000 mg BID@0800,1800 PO  Last administered on 

2/19/19at 09:07;  Start 2/17/19 at 18:00;  Stop 2/19/19 at 11:15;  Status DC


Metoprolol Succinate (TopROL XL) 25 mg DAILY PO  Last administered on 2/19/19at 

09:08;  Start 2/16/19 at 09:00;  Stop 2/19/19 at 11:15;  Status DC


Morphine Sulfate (Morphine Sulfate Inj) 4 mg Q2HP  PRN IV SEVERE PAIN (PS 8-10) 

Last administered on 2/15/19at 18:49;  Start 2/15/19 at 17:30;  Stop 2/17/19 at 

13:20;  Status DC


Ondansetron HCl (ZOFRAN INJection) 4 mg Q6HP  PRN IV NAUSEA OR VOMITING Last 

administered on 2/15/19at 18:49;  Start 2/15/19 at 17:30;  Stop 2/17/19 at 

13:20;  Status DC


Senna/Docusate Sodium (Senokot S) 1 tab BID PO  Last administered on 2/19/19at 

09:07;  Start 2/15/19 at 21:00;  Stop 2/19/19 at 11:15;  Status DC


Sitagliptin Phosphate (Januvia) 100 mg DAILY PO  Last administered on 2/19/19at 

09:06;  Start 2/17/19 at 15:00;  Stop 2/19/19 at 11:15;  Status DC





Allergies


Coded Allergies:  


     Quinapril (Verified  Allergy, Intermediate, shortness of breath, 1/3/19)





Objective


Physical Examination


Examination


GENERAL APPEARANCE: Looks comfortable.


SKIN: Warm and moist.


HEENT: Normocephalic,  atraumatic. Pink palpebral conjunctiva, anicteric 

sclerae. Lips and mucosa appear moist.


NECK: Supple, no thyromegaly. No obvious jugular venous distention.


LUNGS: Clear to auscultation bilaterally. No wheezing appreciated.


HEART: No chest wall abnormalities. Regular rate and rhythm with no murmurs 

appreciated.


ABDOMEN: Abdomen is protuberant, round, mildly distended, tympanitic to pe

rcussion, soft, midline periumbilical incision site with staples intact. Mild 

bruising on the right side of the umbilicus. Rest of the port sites are clean, 

dry and intact. Staples are intact. Nontender on palpation.. .


EXTREMITIES: No edema.


Vital Signs





Vital Signs








  Date Time  Temp Pulse Resp B/P (MAP) Pulse Ox O2 Delivery O2 Flow Rate FiO2


 


2/19/19 09:08  90  135/73    


 


2/19/19 06:00 97.0  16  98   


 


2/18/19 23:40      Room Air  


 


2/17/19 14:00       2.0 











Impression


Postop day 3 following laparoscopic transverse colectomy for malignancy in the 

distal transverse colon with colocolic anastomosis





Pathology is pending I don't expect this to be out until may be Wednesday or 

Thursday. Patient was made aware.


Overall he appears to be doing well. I'll stop the Entereg which may be 

contributing to the diarrhea. I have asked him to continue to ambulate. I will 

restart him also on his Plavix. He continues to do I anticipate he'll be home 

soon.





Plan / VTE


VTE Prophylaxis Ordered?:  Yes











TINO MCGOVERN MD         Feb 21, 2019 08:57

## 2019-03-27 ENCOUNTER — HOSPITAL ENCOUNTER (OUTPATIENT)
Dept: HOSPITAL 53 - M LAB | Age: 54
End: 2019-03-27
Attending: THORACIC SURGERY (CARDIOTHORACIC VASCULAR SURGERY)
Payer: COMMERCIAL

## 2019-03-27 DIAGNOSIS — Z01.818: Primary | ICD-10-CM

## 2019-03-27 DIAGNOSIS — C18.4: ICD-10-CM

## 2019-03-27 DIAGNOSIS — I25.10: ICD-10-CM

## 2019-03-27 LAB
APTT BLD: 32.8 SECONDS (ref 25.4–37.6)
INR PPP: 0.98
PROTHROMBIN TIME: 13.1 SECONDS (ref 12.1–14.4)

## 2019-03-27 NOTE — REP
CHEST X-RAY:  Two views.

 

HISTORY:  Preprocedural encounter.

 

COMPARISON CHEST X-RAY:  September 10, 2017.

 

FINDINGS:  The lungs are well inflated and free of infiltrate.  There is a small

zone linear fibrosis in the left base which it is unchanged from comparison study

April 24, 2016.  There are degenerative changes in the thoracic spine.  Heart is

not enlarged.  Pleural angles are sharp.  Pulmonary vasculature is not

increased.

 

IMPRESSION: Mild linear fibrosis left base.  Degenerative changes in the thoracic

spine.  Otherwise no acute disease.

 

 

Electronically Signed by

Kana Patel MD 03/27/2019 03:54 P

## 2019-03-28 ENCOUNTER — HOSPITAL ENCOUNTER (OUTPATIENT)
Dept: HOSPITAL 53 - M SDC | Age: 54
Discharge: HOME | End: 2019-03-28
Attending: THORACIC SURGERY (CARDIOTHORACIC VASCULAR SURGERY)
Payer: COMMERCIAL

## 2019-03-28 VITALS — DIASTOLIC BLOOD PRESSURE: 75 MMHG | SYSTOLIC BLOOD PRESSURE: 137 MMHG

## 2019-03-28 VITALS — HEIGHT: 73 IN | WEIGHT: 315 LBS | BODY MASS INDEX: 41.75 KG/M2

## 2019-03-28 DIAGNOSIS — I25.10: ICD-10-CM

## 2019-03-28 DIAGNOSIS — Z45.2: ICD-10-CM

## 2019-03-28 DIAGNOSIS — Z79.899: ICD-10-CM

## 2019-03-28 DIAGNOSIS — E11.9: ICD-10-CM

## 2019-03-28 DIAGNOSIS — J45.909: ICD-10-CM

## 2019-03-28 DIAGNOSIS — Z79.51: ICD-10-CM

## 2019-03-28 DIAGNOSIS — I25.2: ICD-10-CM

## 2019-03-28 DIAGNOSIS — C18.9: Primary | ICD-10-CM

## 2019-03-28 DIAGNOSIS — I10: ICD-10-CM

## 2019-03-28 DIAGNOSIS — E78.00: ICD-10-CM

## 2019-03-28 DIAGNOSIS — Z79.84: ICD-10-CM

## 2019-03-28 PROCEDURE — 71045 X-RAY EXAM CHEST 1 VIEW: CPT

## 2019-03-28 PROCEDURE — 36561 INSERT TUNNELED CV CATH: CPT

## 2019-03-28 PROCEDURE — 76000 FLUOROSCOPY <1 HR PHYS/QHP: CPT

## 2019-03-28 NOTE — IPN
DATE:   03/28/2019

 

PREPROCEDURE DIAGNOSIS:  Colon cancer, metastatic, in need of chemotherapy and

vascular access.

 

POSTPROCEDURE DIAGNOSIS:  Colon cancer, metastatic, in need of chemotherapy and

vascular access.

 

PROCEDURE:  Insertion of left subclavian PowerPort with fluoroscopic control.

 

SURGEON:  Kye Lyon MD

 

ASSISTANT:

 

ANESTHESIA:

 

FINDINGS:  All contours were smooth at the end of the procedure. Port aspirated

and flushed well. Catheter was placed at the junction of superior vena cava (SVC)

in the right atrium.

 

DESCRIPTION OF PROCEDURE: Under satisfactory monitored anesthesia care (MAC)

anesthesia, the patient was prepped and draped in the usual sterile fashion. The

vein was found on the first pass and a wire was placed. Wire position was

confirmed by fluoroscopy. The port incision was then infiltrated with Exparel,

incision made, and a subcutaneous pocket created by use of both electrocautery

and blunt dissection. The wire site was incised and dilated. The catheter was

placed low numbers down into the right atrium. A tunnel was created from the wire

site to the port site, and the catheter pulled through under fluoroscopy control.

Catheter was cut to an appropriate length, collar was placed and connected to the

PowerPort. PowerPort was then flushed with heparinized saline and a final flush

of 100 units per mL of saline.

 

PowerPort was then secured to the chest wall with two #2-0 silk sutures. X-ray

was then undertaken along with fluoroscopy and showed that all contours were

smooth and the catheter was positioned at the junction of the right atrium and

SVC. Incisions were closed with a running #3-0 Vicryl suture for the subcutaneous

tissue, running #4-0 Monocryl subcuticular suture for the skin. Patient tolerated

the procedure well and left the operating room in satisfactory condition for the

recovery room.

## 2019-03-28 NOTE — REP
Clinical:  Status post Ifcafy-L-Mqgd placement .

 

Comparison:  03/27/2019,  04/04/2017.

 

Findings:

Left-sided Qagqiq-M-Jlqk with tip in the SVC.  No pneumothorax.  The mediastinum

and cardiac silhouette are stable and within normal limits.  No focal

consolidation.  No effusion. Skeletal structures are intact.

 

Impression:

1.  Bbfuij-S-Vccg in satisfactory position.  No pneumothorax.

 

 

 

Electronically Signed by

Gregory Chua MD 03/28/2019 03:00 P

## 2019-03-28 NOTE — REP
Partial chest x-ray:  Two views.

 

History:  Jkztzs-R-Lych placement.

 

Findings:  A sequence of two last image hold fluoroscopically obtained spot

radiographs document left subclavian Dfeuid-Y-Iehq placement.  47 seconds of

fluoroscopy time is reported.

 

 

Electronically Signed by

Kana Patel MD 03/28/2019 02:32 P

## 2019-04-04 ENCOUNTER — HOSPITAL ENCOUNTER (OUTPATIENT)
Dept: HOSPITAL 53 - M SMT | Age: 54
End: 2019-04-04
Attending: THORACIC SURGERY (CARDIOTHORACIC VASCULAR SURGERY)
Payer: COMMERCIAL

## 2019-04-04 DIAGNOSIS — C18.4: Primary | ICD-10-CM

## 2019-04-04 NOTE — REP
Chest two views

 

HISTORY: Colon cancer

 

Comparison: 03/28/2019

 

The lungs are clear.  The heart is normal in size.  The pulmonary vasculature is

normal in appearance.  The bony structure is intact. An Nmdqua-A-Wpuy catheter is

present.

 

IMPRESSION:  No acute disease.

 

 

Electronically Signed by

Aristeo Kumari MD 04/04/2019 09:18 A

## 2019-12-31 ENCOUNTER — HOSPITAL ENCOUNTER (EMERGENCY)
Dept: HOSPITAL 53 - M ED | Age: 54
Discharge: HOME | End: 2019-12-31
Payer: COMMERCIAL

## 2019-12-31 VITALS — SYSTOLIC BLOOD PRESSURE: 121 MMHG | DIASTOLIC BLOOD PRESSURE: 65 MMHG

## 2019-12-31 VITALS — WEIGHT: 315 LBS | BODY MASS INDEX: 41.75 KG/M2 | HEIGHT: 73 IN

## 2019-12-31 DIAGNOSIS — I11.9: ICD-10-CM

## 2019-12-31 DIAGNOSIS — M25.78: ICD-10-CM

## 2019-12-31 DIAGNOSIS — Z79.51: ICD-10-CM

## 2019-12-31 DIAGNOSIS — Z79.82: ICD-10-CM

## 2019-12-31 DIAGNOSIS — Z79.01: ICD-10-CM

## 2019-12-31 DIAGNOSIS — F17.200: ICD-10-CM

## 2019-12-31 DIAGNOSIS — C18.9: ICD-10-CM

## 2019-12-31 DIAGNOSIS — Z79.899: ICD-10-CM

## 2019-12-31 DIAGNOSIS — Z79.84: ICD-10-CM

## 2019-12-31 DIAGNOSIS — Z88.8: ICD-10-CM

## 2019-12-31 DIAGNOSIS — M54.9: Primary | ICD-10-CM

## 2019-12-31 DIAGNOSIS — E11.9: ICD-10-CM

## 2019-12-31 LAB
ALBUMIN SERPL BCG-MCNC: 3.7 GM/DL (ref 3.2–5.2)
ALT SERPL W P-5'-P-CCNC: 47 U/L (ref 12–78)
BASOPHILS # BLD AUTO: 0.1 10^3/UL (ref 0–0.2)
BASOPHILS NFR BLD AUTO: 0.8 % (ref 0–1)
BILIRUB CONJ SERPL-MCNC: 0.2 MG/DL (ref 0–0.2)
BILIRUB SERPL-MCNC: 0.8 MG/DL (ref 0.2–1)
BUN SERPL-MCNC: 13 MG/DL (ref 7–18)
CALCIUM SERPL-MCNC: 9.6 MG/DL (ref 8.5–10.1)
CHLORIDE SERPL-SCNC: 104 MEQ/L (ref 98–107)
CK MB CFR.DF SERPL CALC: 0.78
CK MB SERPL-MCNC: < 1 NG/ML (ref ?–3.6)
CK SERPL-CCNC: 128 U/L (ref 39–308)
CO2 SERPL-SCNC: 25 MEQ/L (ref 21–32)
CREAT SERPL-MCNC: 0.95 MG/DL (ref 0.7–1.3)
EOSINOPHIL # BLD AUTO: 0.6 10^3/UL (ref 0–0.5)
EOSINOPHIL NFR BLD AUTO: 6.4 % (ref 0–3)
GFR SERPL CREATININE-BSD FRML MDRD: > 60 ML/MIN/{1.73_M2} (ref 56–?)
GLUCOSE SERPL-MCNC: 264 MG/DL (ref 70–100)
HCT VFR BLD AUTO: 49.7 % (ref 42–52)
HGB BLD-MCNC: 16.6 G/DL (ref 13.5–17.5)
INR PPP: 1.03
LIPASE SERPL-CCNC: 150 U/L (ref 73–393)
LYMPHOCYTES # BLD AUTO: 1.8 10^3/UL (ref 1.5–5)
LYMPHOCYTES NFR BLD AUTO: 21.2 % (ref 24–44)
MCH RBC QN AUTO: 31.1 PG (ref 27–33)
MCHC RBC AUTO-ENTMCNC: 33.4 G/DL (ref 32–36.5)
MCV RBC AUTO: 93.2 FL (ref 80–96)
MONOCYTES # BLD AUTO: 0.5 10^3/UL (ref 0–0.8)
MONOCYTES NFR BLD AUTO: 6.1 % (ref 0–5)
NEUTROPHILS # BLD AUTO: 5.6 10^3/UL (ref 1.5–8.5)
NEUTROPHILS NFR BLD AUTO: 65 % (ref 36–66)
PLATELET # BLD AUTO: 198 10^3/UL (ref 150–450)
POTASSIUM SERPL-SCNC: 4.8 MEQ/L (ref 3.5–5.1)
PROT SERPL-MCNC: 7.6 GM/DL (ref 6.4–8.2)
PROTHROMBIN TIME: 13.2 SECONDS (ref 11.8–14)
RBC # BLD AUTO: 5.33 10^6/UL (ref 4.3–6.1)
SODIUM SERPL-SCNC: 138 MEQ/L (ref 136–145)
TROPONIN I SERPL-MCNC: < 0.02 NG/ML (ref ?–0.1)
WBC # BLD AUTO: 8.7 10^3/UL (ref 4–10)

## 2019-12-31 NOTE — REP
Clinical:  Chest pain .

 

Comparison:  04/04/2019 .

 

Findings:

Wslwts-Q-Lvgk with tip in the SVC.  The mediastinum and cardiac silhouette are

stable and within normal limits for portable technique.  The lung fields are

stable without acute consolidation, effusion, or pneumothorax.  Skeletal

structures are intact.

 

Impression:

Chronic stable changes.  No focal consolidation or effusion appreciated.

 

 

Electronically Signed by

Gregory Chua MD 12/31/2019 01:01 P

## 2019-12-31 NOTE — REP
Clinical:  Pain.

 

Technique:  Axial noncontrast images through the thoracic spine with coronal and

sagittal re-formations.

 

Findings:

Alignment and kyphosis maintained.  Vertebral bodies are intact.  No acute

fracture / compression injury or subluxation.  Mild age-related changes include

marginal spurring/osteophyte formation along with minimal scattered endplate

irregularity.  Posterior elements and spinous processes appear intact.  Spinal

canal appears patent.  Paravertebral soft tissues are normal.

 

Impression:

Mild generalized age-related changes.

No acute fracture / compression injury or subluxation.

 

 

Electronically Signed by

Gregory Chua MD 12/31/2019 01:21 P

## 2020-01-10 ENCOUNTER — HOSPITAL ENCOUNTER (OUTPATIENT)
Dept: HOSPITAL 53 - M LAB | Age: 55
End: 2020-01-10
Attending: INTERNAL MEDICINE
Payer: COMMERCIAL

## 2020-01-10 DIAGNOSIS — C18.4: Primary | ICD-10-CM

## 2020-01-10 LAB
BASOPHILS # BLD AUTO: 0.1 10^3/UL (ref 0–0.2)
BASOPHILS NFR BLD AUTO: 0.9 % (ref 0–1)
BUN SERPL-MCNC: 10 MG/DL (ref 7–18)
CREAT SERPL-MCNC: 0.9 MG/DL (ref 0.7–1.3)
EOSINOPHIL # BLD AUTO: 0.7 10^3/UL (ref 0–0.5)
EOSINOPHIL NFR BLD AUTO: 8.9 % (ref 0–3)
FERRITIN SERPL-MCNC: 334 NG/ML (ref 26–388)
GFR SERPL CREATININE-BSD FRML MDRD: > 60 ML/MIN/{1.73_M2} (ref 56–?)
HCT VFR BLD AUTO: 49.8 % (ref 42–52)
HGB BLD-MCNC: 16.4 G/DL (ref 13.5–17.5)
IRON SATN MFR SERPL: 25.1 % (ref 19.7–50)
IRON SERPL-MCNC: 71 UG/DL (ref 65–175)
LYMPHOCYTES # BLD AUTO: 1.7 10^3/UL (ref 1.5–5)
LYMPHOCYTES NFR BLD AUTO: 22.1 % (ref 24–44)
MCH RBC QN AUTO: 30.8 PG (ref 27–33)
MCHC RBC AUTO-ENTMCNC: 32.9 G/DL (ref 32–36.5)
MCV RBC AUTO: 93.4 FL (ref 80–96)
MONOCYTES # BLD AUTO: 0.4 10^3/UL (ref 0–0.8)
MONOCYTES NFR BLD AUTO: 5.7 % (ref 0–5)
NEUTROPHILS # BLD AUTO: 4.7 10^3/UL (ref 1.5–8.5)
NEUTROPHILS NFR BLD AUTO: 61.9 % (ref 36–66)
PLATELET # BLD AUTO: 208 10^3/UL (ref 150–450)
RBC # BLD AUTO: 5.33 10^6/UL (ref 4.3–6.1)
TIBC SERPL-MCNC: 283 UG/DL (ref 250–450)
WBC # BLD AUTO: 7.6 10^3/UL (ref 4–10)

## 2020-03-06 ENCOUNTER — HOSPITAL ENCOUNTER (OUTPATIENT)
Dept: HOSPITAL 53 - M RAD | Age: 55
End: 2020-03-06
Attending: INTERNAL MEDICINE
Payer: COMMERCIAL

## 2020-03-06 DIAGNOSIS — C18.9: ICD-10-CM

## 2020-03-06 DIAGNOSIS — K76.0: Primary | ICD-10-CM

## 2020-03-06 DIAGNOSIS — R16.1: ICD-10-CM

## 2020-03-06 NOTE — REPVR
PROCEDURE INFORMATION: 

Exam: CT Abdomen And Pelvis Without Contrast 

Exam date and time: 3/6/2020 10:42 AM 

Age: 54 years old 

Clinical indication: Pain and condition or disease; Cancer; Other: Colon; 

Additional info: Restaging colon CA 



TECHNIQUE: 

Imaging protocol: Computed tomography of the abdomen and pelvis without 

contrast. 

Radiation optimization: All CT scans at this facility use at least one of these 

dose optimization techniques: automated exposure control; mA and/or kV 

adjustment per patient size (includes targeted exams where dose is matched to 

clinical indication); or iterative reconstruction. 



COMPARISON: 

CT ABD PELVIS WITH CONTRAST 1/28/2019 10:12 AM 



FINDINGS: 

Detailed evaluation of the abdominal and pelvic viscera is somewhat limited in 

the absence of intravenous contrast.



Lungs: Trace lingular airspace disease. Coronary artery calcification. 



Liver: Fatty infiltration of the liver. 

Gallbladder and bile ducts: No cholelithiasis or biliary ductal dilatation. 

Pancreas: No pancreatic mass or ductal dilatation. 

Spleen: Enlarged spleen measuring 13.8 cm in length. 2.3 cm accessory spleen. 

Adrenals: Unremarkable adrenals. 

Kidneys and ureters: Normal renal morphology. No hydronephrosis. Mild bilateral 

infiltration of perinephric fat. 

Stomach and bowel: Postoperative change in the colon, without evidence for 

local tumor recurrence. Prominent stool suggesting constipation. No small bowel 

dilatation. 

Appendix: Appendix not visualized. 

Intraperitoneal space: No free fluid. 

Vasculature: Vascular calcification. Normal caliber of the abdominal aorta. 

Lymph nodes: No pathologically enlarged lymph nodes are prior 

Bladder: Normal bladder morphology. 

Reproductive: Punctate prostate calcification. 

Bones/joints: Degenerative change. Punctate pelvic bone islands. 

Soft tissues: Laxity of the anterior abdominal wall and superimposed umbilical 

hernia. Small fat containing inguinal hernias. 



IMPRESSION: 

1. No acute inflammatory process in the abdomen or pelvis.

2. Additional findings as described above.   



Electronically signed by: Abhijit Mcintosh On 03/06/2020  12:27:57 PM

## 2020-03-10 ENCOUNTER — HOSPITAL ENCOUNTER (OUTPATIENT)
Dept: HOSPITAL 53 - M OPP | Age: 55
Discharge: HOME | End: 2020-03-10
Attending: INTERNAL MEDICINE
Payer: COMMERCIAL

## 2020-03-10 VITALS — HEIGHT: 73 IN | WEIGHT: 315 LBS | BODY MASS INDEX: 41.75 KG/M2

## 2020-03-10 VITALS — DIASTOLIC BLOOD PRESSURE: 80 MMHG | SYSTOLIC BLOOD PRESSURE: 133 MMHG

## 2020-03-10 DIAGNOSIS — E78.5: ICD-10-CM

## 2020-03-10 DIAGNOSIS — J45.909: ICD-10-CM

## 2020-03-10 DIAGNOSIS — Z88.8: ICD-10-CM

## 2020-03-10 DIAGNOSIS — D64.9: ICD-10-CM

## 2020-03-10 DIAGNOSIS — E11.9: ICD-10-CM

## 2020-03-10 DIAGNOSIS — K64.8: ICD-10-CM

## 2020-03-10 DIAGNOSIS — I10: ICD-10-CM

## 2020-03-10 DIAGNOSIS — Z79.899: ICD-10-CM

## 2020-03-10 DIAGNOSIS — G47.30: ICD-10-CM

## 2020-03-10 DIAGNOSIS — Z79.84: ICD-10-CM

## 2020-03-10 DIAGNOSIS — Z82.49: ICD-10-CM

## 2020-03-10 DIAGNOSIS — Z79.82: ICD-10-CM

## 2020-03-10 DIAGNOSIS — Z98.0: ICD-10-CM

## 2020-03-10 DIAGNOSIS — I71.4: ICD-10-CM

## 2020-03-10 DIAGNOSIS — Z92.3: ICD-10-CM

## 2020-03-10 DIAGNOSIS — D12.2: ICD-10-CM

## 2020-03-10 DIAGNOSIS — Z85.038: Primary | ICD-10-CM

## 2020-03-10 DIAGNOSIS — E78.00: ICD-10-CM

## 2020-03-10 DIAGNOSIS — Z83.3: ICD-10-CM

## 2020-03-10 NOTE — ROOR
________________________________________________________________________________

Patient Name: Freid Bose        Procedure Date: 3/10/2020 8:26 AM

MRN: [id number]                          Account Number: [id number]

YOB: 1965               Age: 54

Room: [facility]                            Gender: Male

Note Status: Finalized                 

________________________________________________________________________________

 

Procedure:           Colonoscopy

Indications:         High risk colon cancer surveillance: Personal history of 

                     colon cancer

Providers:           Jeremy Flores MD

Referring MD:        ARMIN VELÁZQUEZ MD

Requesting Provider: 

Medicines:           Monitored Anesthesia Care

Complications:       No immediate complications.

________________________________________________________________________________

Procedure:           Pre-Anesthesia Assessment:

                     - Prior to the procedure, a History and Physical was 

                     performed, and patient medications and allergies were 

                     reviewed. The patient is competent. The risks and 

                     benefits of the procedure and the sedation options and 

                     risks were discussed with the patient. All questions were 

                     answered and informed consent was obtained. Patient 

                     identification and proposed procedure were verified by 

                     the physician, the nurse and the anesthesiologist in the 

                     procedure room. Mental Status Examination: alert and 

                     oriented. Airway Examination: normal oropharyngeal airway 

                     and neck mobility. Respiratory Examination: clear to 

                     auscultation. CV Examination: normal. Prophylactic 

                     Antibiotics: The patient does not require prophylactic 

                     antibiotics. Prior Anticoagulants: The patient has taken 

                     Plavix (clopidogrel), last dose was 7 days prior to 

                     procedure. ASA Grade Assessment: III - A patient with 

                     severe systemic disease. After reviewing the risks and 

                     benefits, the patient was deemed in satisfactory 

                     condition to undergo the procedure. The anesthesia plan 

                     was to use monitored anesthesia care (MAC). Immediately 

                     prior to administration of medications, the patient was 

                     re-assessed for adequacy to receive sedatives. The heart 

                     rate, respiratory rate, oxygen saturations, blood 

                     pressure, adequacy of pulmonary ventilation, and response 

                     to care were monitored throughout the procedure. The 

                     physical status of the patient was re-assessed after the 

                     procedure.

                     The Colonoscope was introduced through the anus and 

                     advanced to the terminal ileum, with identification of 

                     the appendiceal orifice and IC valve. The colonoscopy was 

                     performed without difficulty. The patient tolerated the 

                     procedure well. The quality of the bowel preparation was 

                     good. The terminal ileum, ileocecal valve, appendiceal 

                     orifice, and rectum were photographed. Scope insertion 

                     time was 2 minutes. Scope withdrawal time was 10 minutes. 

                     The total duration of the procedure was 12 minutes.

                                                                                

Findings:

     The perianal and digital rectal examinations were normal.

     The terminal ileum appeared normal.

     A 8 mm polyp was found in the ascending colon. The polyp was sessile. The 

     polyp was removed with a cold snare. Resection and retrieval were 

     complete. Verification of patient identification for the specimen was 

     done by the physician and nurse using the patient's name, birth date and 

     medical record number. Estimated blood loss was minimal.

     A 4 mm polyp was found in the descending colon. The polyp was sessile. 

     The polyp was removed with a cold snare. Resection and retrieval were 

     complete.

     There was evidence of a prior functional end-to-end colo-colonic 

     anastomosis in the transverse colon. This was patent and was 

     characterized by healthy appearing mucosa.

     Non-bleeding external and internal hemorrhoids were found during 

     retroflexion. The hemorrhoids were small.

                                                                                

Impression:          - The examined portion of the ileum was normal.

                     - One 8 mm polyp in the ascending colon, removed with a 

                     cold snare. Resected and retrieved.

                     - One 4 mm polyp in the descending colon, removed with a 

                     cold snare. Resected and retrieved.

                     - Patent functional end-to-end colo-colonic anastomosis, 

                     characterized by healthy appearing mucosa.

                     - Non-bleeding external and internal hemorrhoids.

Recommendation:      - Patient has a contact number available for emergencies. 

                     The signs and symptoms of potential delayed complications 

                     were discussed with the patient. Return to normal 

                     activities tomorrow. Written discharge instructions were 

                     provided to the patient.

                     - High fiber diet.

                     - Continue present medications.

                     - Resume aspirin tomorrow and Plavix (clopidogrel) 

                     tomorrow at prior doses. Refer to primary physician for 

                     further adjustment of therapy.

                     - Await pathology results.

                     - Repeat colonoscopy in 3 years for surveillance based on 

                     pathology results and for surveillance based on personal 

                     history of colon cancer.

                     - Telephone GI clinic for pathology results in 2 weeks.

                     - Return to primary care physician.

                                                                                

 

Jeremy Flores MD

_______________________

Jeremy Flores MD

3/10/2020 9:05:38 AM

Electronically signed by Jeremy Flores MD

Number of Addenda: 0

 

Note Initiated On: 3/10/2020 8:26 AM

Estimated Blood Loss:

     Estimated blood loss was minimal.

## 2020-03-18 ENCOUNTER — HOSPITAL ENCOUNTER (OUTPATIENT)
Dept: HOSPITAL 53 - M RAD | Age: 55
End: 2020-03-18
Attending: INTERNAL MEDICINE
Payer: COMMERCIAL

## 2020-03-18 DIAGNOSIS — C18.9: ICD-10-CM

## 2020-03-18 DIAGNOSIS — R91.1: Primary | ICD-10-CM

## 2020-03-18 PROCEDURE — 71260 CT THORAX DX C+: CPT

## 2020-03-18 NOTE — REP
Clinical:  Colon cancer.  Restaging.

 

Technique:  Axial contrast enhanced images from the thoracic inlet to the upper

abdomen with coronal and sagittal re-formations using 100 ml Isovue 370

intravenous contrast material.

 

Comparison:  01/28/2019.

 

Findings:

Lung fields are well-aerated and demonstrate mild age-related chronic appearing

interstitial changes along with minimal scarring primarily noted in the anterior

right upper lobe and lingula.  A small 5 mm noncalcified nodule is identified in

the posterior left upper lobe (image 33).  No further obvious nodule, mass or

consolidation.  No pleural effusion.  No obvious axillary, hilar, or mediastinal

adenopathy.  The mediastinum demonstrates atherosclerotic changes to the thoracic

aorta and coronary arteries without aortic aneurysm or cardiomegaly.  No

pericardial effusion.  Surrounding musculoskeletal structures without focal

abnormality.

 

Impression:

1.  5 mm noncalcified nodule in the left upper lobe.  Finding is nonspecific.

Given the patient's history of metastatic disease cannot definitively be

excluded.  Consider short-term follow-up in 3-6 months.

 

 

Electronically Signed by

Gregory Chua MD 03/18/2020 06:59 P

## 2020-06-08 ENCOUNTER — HOSPITAL ENCOUNTER (OUTPATIENT)
Dept: HOSPITAL 53 - M RAD | Age: 55
End: 2020-06-08
Attending: INTERNAL MEDICINE
Payer: COMMERCIAL

## 2020-06-08 DIAGNOSIS — R91.1: Primary | ICD-10-CM

## 2020-06-09 NOTE — REP
Clinical:  Follow up left upper lobe nodule.

 

Technique:  Axial noncontrast images from the thoracic inlet to the upper abdomen

with coronal and sagittal re-formations.

 

Comparison:  01/28/2019.

 

Findings:

A left upper lobe nodule is again identified measuring approximately 7 mm and may

be slightly increased in size when compared to recent prior examination.  No

further consolidation, significant nodule or mass lesion is appreciated.  Linear

scarring at the lingula is again noted and unchanged.  No effusion.  No

pneumothorax.  No obvious adenopathy.  Tracheobronchial tree is patent.

Mediastinum demonstrates stable atherosclerotic changes to the thoracic aorta and

coronary arteries without aortic aneurysm or cardiomegaly.  No pericardial

effusion.

 

Limited upper abdomen demonstrates normal bilateral adrenal glands.

Musculoskeletal structures demonstrate age-related changes without acute osseous

abnormality.

 

Impression:

1.  The left upper lobe nodule now measures approximately 7 mm and may be

slightly increased in size.

2.  No further obvious acute mediastinal or pleuroparenchymal process

appreciated.

 

 

 

 

Electronically Signed by

Gregory Chua MD 06/09/2020 07:19 A

## 2020-09-02 ENCOUNTER — HOSPITAL ENCOUNTER (OUTPATIENT)
Dept: HOSPITAL 53 - M RAD | Age: 55
End: 2020-09-02
Attending: INTERNAL MEDICINE
Payer: COMMERCIAL

## 2020-09-02 DIAGNOSIS — R91.1: Primary | ICD-10-CM

## 2020-09-22 ENCOUNTER — HOSPITAL ENCOUNTER (OUTPATIENT)
Dept: HOSPITAL 53 - M PLARAD | Age: 55
End: 2020-09-22
Attending: INTERNAL MEDICINE
Payer: COMMERCIAL

## 2020-09-22 DIAGNOSIS — R91.1: Primary | ICD-10-CM

## 2020-09-22 PROCEDURE — 78815 PET IMAGE W/CT SKULL-THIGH: CPT

## 2020-10-02 NOTE — REP
PET/CT



HISTORY: Solitary pulmonary nodule. Comparison Chest CT study 09/02/2020 showed 
an 18 mm enlarging nodule in the left upper lobe. 



TECHNIQUE: Forty-five minutes following the intravenous injection of a 9.36 
millicurie dose of F18 fludeoxyglucose, three dimensional PET scintigraphy and 
CT imaging is acquired from the skull base to the proximal thighs. 



PET/CT FINDINGS:



Head and neck soft tissues were unremarkable. An Iowjaz-H-Nipx catheter was 
noted on the left with its tip in the superior vena cava. There is no evidence 
of hilar or mediastinal hypermetabolic uptake within the thorax. The recently 
identified nodule in the left upper lobe is again seen. This demonstrates 
visible although not hypermetabolic FDG accumulation. Maximum standard uptake 
value is 2.16. Despite this, its enlarging morphology is felt to be suspicious 
for primary or secondary malignancy. No other abnormal hypermetabolic uptake is 
seen in the chest. No extrathoracic hypermetabolic uptake is seen. 



In the abdomen and pelvis, there is normal distribution of F18 FDG to the liver,
spleen, gastrointestinal, and genitourinary system. No abnormal hypermetabolic 
uptake is seen in the abdomen or pelvis. There is some mild misregistration due 
to motion in the pelvic sequences of the images. 



IMPRESSION: 



There is discernible although nonhypermetabolic uptake in the suspicious nodule 
in the left upper lobe.  No other abnormal hypermetabolic uptake is seen. 

 

MTDD

## 2020-10-07 ENCOUNTER — HOSPITAL ENCOUNTER (OUTPATIENT)
Dept: HOSPITAL 53 - M LAB | Age: 55
End: 2020-10-07
Payer: COMMERCIAL

## 2020-10-07 ENCOUNTER — HOSPITAL ENCOUNTER (OUTPATIENT)
Dept: HOSPITAL 53 - M LAB | Age: 55
End: 2020-10-07
Attending: INTERNAL MEDICINE
Payer: COMMERCIAL

## 2020-10-07 DIAGNOSIS — E11.9: Primary | ICD-10-CM

## 2020-10-07 DIAGNOSIS — E11.65: Primary | ICD-10-CM

## 2020-10-07 LAB
ALBUMIN SERPL BCG-MCNC: 3.6 GM/DL (ref 3.2–5.2)
ALT SERPL W P-5'-P-CCNC: 38 U/L (ref 12–78)
BILIRUB SERPL-MCNC: 0.8 MG/DL (ref 0.2–1)
BUN SERPL-MCNC: 14 MG/DL (ref 7–18)
CALCIUM SERPL-MCNC: 9 MG/DL (ref 8.5–10.1)
CHLORIDE SERPL-SCNC: 107 MEQ/L (ref 98–107)
CHOLEST SERPL-MCNC: 161 MG/DL (ref ?–200)
CHOLEST/HDLC SERPL: 3.29 {RATIO} (ref ?–5)
CO2 SERPL-SCNC: 25 MEQ/L (ref 21–32)
CREAT SERPL-MCNC: 0.75 MG/DL (ref 0.7–1.3)
CREAT UR-MCNC: 138 MG/DL
GFR SERPL CREATININE-BSD FRML MDRD: > 60 ML/MIN/{1.73_M2} (ref 56–?)
GLUCOSE SERPL-MCNC: 132 MG/DL (ref 70–100)
HCT VFR BLD AUTO: 48.5 % (ref 42–52)
HDLC SERPL-MCNC: 49 MG/DL (ref 40–?)
HGB BLD-MCNC: 16.2 G/DL (ref 13.5–17.5)
LDLC SERPL CALC-MCNC: 79 MG/DL (ref ?–100)
MCH RBC QN AUTO: 30.6 PG (ref 27–33)
MCHC RBC AUTO-ENTMCNC: 33.4 G/DL (ref 32–36.5)
MCV RBC AUTO: 91.5 FL (ref 80–96)
MICROALBUMIN UR-MCNC: 67.6 MG/L
MICROALBUMIN/CREAT UR: 48.9 MCG/MG (ref 0–30)
NONHDLC SERPL-MCNC: 112 MG/DL
PLATELET # BLD AUTO: 200 10^3/UL (ref 150–450)
POTASSIUM SERPL-SCNC: 4.1 MEQ/L (ref 3.5–5.1)
PROT SERPL-MCNC: 7.2 GM/DL (ref 6.4–8.2)
RBC # BLD AUTO: 5.3 10^6/UL (ref 4.3–6.1)
SODIUM SERPL-SCNC: 139 MEQ/L (ref 136–145)
TRIGL SERPL-MCNC: 163 MG/DL (ref ?–150)
WBC # BLD AUTO: 8.5 10^3/UL (ref 4–10)

## 2020-11-30 ENCOUNTER — HOSPITAL ENCOUNTER (OUTPATIENT)
Dept: HOSPITAL 53 - M RAD | Age: 55
End: 2020-11-30
Attending: INTERNAL MEDICINE
Payer: COMMERCIAL

## 2020-11-30 DIAGNOSIS — R91.1: Primary | ICD-10-CM

## 2020-11-30 NOTE — REP
INDICATION:

SOLITARY PULMONARY NODULE



COMPARISON:

None



TECHNIQUE:

Axial noncontrast images from the thoracic inlet to the upper abdomen with coronal and

sagittal reformations.



This CT examination was performed using the following dose reduction techniques:

Automated exposure control, adjustment of mA and/or kv according to the patient's

size, and use of iterative reconstruction technique.



FINDINGS:

Left upper lobe nodule increased in size from 18mm to 25mm.  No further suspicious

nodule or mass lesion appreciated.  No consolidation.  No effusion.  No pneumothorax.

No obvious adenopathy noted.  Atherosclerotic changes to the thoracic aorta and

coronary arteries identified without aortic aneurysm or cardiomegaly.  No pericardial

effusion.  Surrounding musculoskeletal structures without acute abnormality.



IMPRESSION:

Left upper lobe nodule appears increased to 25 mm.





<Electronically signed by Gregory Chua > 11/30/20 [id number]

## 2021-01-10 ENCOUNTER — HOSPITAL ENCOUNTER (OUTPATIENT)
Dept: HOSPITAL 53 - M LABSMTC | Age: 56
End: 2021-01-10
Attending: ANESTHESIOLOGY
Payer: COMMERCIAL

## 2021-01-10 DIAGNOSIS — Z20.822: ICD-10-CM

## 2021-01-10 DIAGNOSIS — Z01.812: Primary | ICD-10-CM

## 2021-01-11 ENCOUNTER — HOSPITAL ENCOUNTER (OUTPATIENT)
Dept: HOSPITAL 53 - M ADMPAT | Age: 56
End: 2021-01-11
Attending: THORACIC SURGERY (CARDIOTHORACIC VASCULAR SURGERY)
Payer: COMMERCIAL

## 2021-01-11 DIAGNOSIS — R91.1: Primary | ICD-10-CM

## 2021-01-11 LAB
APTT BLD: 34.8 SECONDS (ref 24.2–38.5)
BASE EXCESS BLDA CALC-SCNC: -3.5 MMOL/L (ref -2–2)
BUN SERPL-MCNC: 12 MG/DL (ref 7–18)
CALCIUM SERPL-MCNC: 9 MG/DL (ref 8.5–10.1)
CHLORIDE SERPL-SCNC: 104 MEQ/L (ref 98–107)
CO2 BLDA CALC-SCNC: 21.6 MEQ/L (ref 22–29)
CO2 SERPL-SCNC: 29 MEQ/L (ref 21–32)
CREAT SERPL-MCNC: 1 MG/DL (ref 0.7–1.3)
GFR SERPL CREATININE-BSD FRML MDRD: > 60 ML/MIN/{1.73_M2} (ref 56–?)
GLUCOSE SERPL-MCNC: 129 MG/DL (ref 70–100)
HCO3 BLDA-SCNC: 20.5 MEQ/L (ref 22–26)
HCO3 STD BLDA-SCNC: 21.6 MEQ/L (ref 22–26)
HCT VFR BLD AUTO: 52.6 % (ref 42–52)
HGB BLD-MCNC: 17.3 G/DL (ref 13.5–17.5)
INR PPP: 0.93
MCH RBC QN AUTO: 30.7 PG (ref 27–33)
MCHC RBC AUTO-ENTMCNC: 32.9 G/DL (ref 32–36.5)
MCV RBC AUTO: 93.4 FL (ref 80–96)
PCO2 BLDA: 34.7 MMHG (ref 35–45)
PH BLDA: 7.39 UNITS (ref 7.35–7.45)
PLATELET # BLD AUTO: 189 10^3/UL (ref 150–450)
PO2 BLDA: 77.3 MMHG (ref 75–100)
POTASSIUM SERPL-SCNC: 3.9 MEQ/L (ref 3.5–5.1)
PROTHROMBIN TIME: 12.7 SECONDS (ref 12.5–14.3)
RBC # BLD AUTO: 5.63 10^6/UL (ref 4.3–6.1)
SAO2 % BLDA: 96.1 % (ref 95–99)
SODIUM SERPL-SCNC: 138 MEQ/L (ref 136–145)
WBC # BLD AUTO: 6.1 10^3/UL (ref 4–10)

## 2021-01-11 NOTE — ECGEPIP
[facility]

                                       

                                       Test Date:    2021

Pat Name:     KATJA ARENAS        Department:   

Patient ID:   [id number]                 Room:         -

Gender:       Male                     Technician:   

:          1965               Requested By: Kye RAMOS

Order Number: [id number]     Reading MD:   Kevin Nava

                                 Measurements

Intervals                              Axis          

Rate:         96                       P:            49

SC:           167                      QRS:          7

QRSD:         99                       T:            38

QT:           344                                    

QTc:          435                                    

                           Interpretive Statements

SINUS RHYTHM

SIMILAR TO 9/10/2017

Electronically Signed on 2021 18:46:39 EST by Kevin Nava

## 2021-01-11 NOTE — REP
INDICATION:

PULMONDARY NODULE



COMPARISON:

12/31/2019



TECHNIQUE:

PA and lateral.



FINDINGS:

The mediastinum and cardiac silhouette are normal.  Akigvm-H-Kixm with tip in the SVC

remains stable.  The lung fields demonstrate chronic changes without acute

consolidation, effusion, or pneumothorax.  The skeletal structures are intact and

normal.



IMPRESSION:

No acute cardiopulmonary process.





<Electronically signed by Gregory Chua > 01/11/21 [id number]

## 2021-02-14 ENCOUNTER — HOSPITAL ENCOUNTER (OUTPATIENT)
Dept: HOSPITAL 53 - M RAD | Age: 56
End: 2021-02-14
Attending: PHYSICIAN ASSISTANT
Payer: COMMERCIAL

## 2021-02-14 DIAGNOSIS — M25.542: Primary | ICD-10-CM

## 2021-02-14 DIAGNOSIS — M79.5: ICD-10-CM

## 2021-02-14 DIAGNOSIS — M19.042: ICD-10-CM

## 2021-02-25 ENCOUNTER — HOSPITAL ENCOUNTER (OUTPATIENT)
Dept: HOSPITAL 53 - M RAD | Age: 56
End: 2021-02-25
Attending: THORACIC SURGERY (CARDIOTHORACIC VASCULAR SURGERY)
Payer: COMMERCIAL

## 2021-02-25 DIAGNOSIS — R91.1: Primary | ICD-10-CM

## 2021-02-25 NOTE — REP
INDICATION:

SOLITARY PULMONARY NODULE.



COMPARISON:

Comparison chest CT studies are reviewed from November 30, 2020 and June 8, 2020..



TECHNIQUE:

Helical scanning is acquired.  3 mm axial images are generated.  Coronal and sagittal

MPR and coronal MIP images are generated.



FINDINGS:

Previously noted left upper lobe nodule is again seen, increased in size over the

prior study from November 30, 2020.  Its current dimensions are 2.3 x 2.2 x 2.6 cm.

It has irregular nodular borders and some slight spiculation.  No other pulmonary

nodule is appreciated.  Lung fields are otherwise clear.  There is no evidence of

pleural or pericardial effusion.  No mediastinal adenopathy is seen.  No obvious hilar

adenopathy is observed.  There is left coronary artery vascular calcification fairly

extensively.  Normal adrenal glands are observed.  The visualized upper abdominal

structures are unremarkable.  No axillary or supraclavicular adenopathy is

appreciated.  A left-sided Chqxqi-N-Tdhf catheter is noted in place.



IMPRESSION:

Gradually enlarging left upper lobe nodule, now 2.6 cm in greatest diameter.





<Electronically signed by John Patel > 02/25/21 [id number]

## 2021-03-02 ENCOUNTER — HOSPITAL ENCOUNTER (OUTPATIENT)
Dept: HOSPITAL 53 - M ADMPAT | Age: 56
End: 2021-03-02
Attending: THORACIC SURGERY (CARDIOTHORACIC VASCULAR SURGERY)
Payer: COMMERCIAL

## 2021-03-02 DIAGNOSIS — Z01.818: Primary | ICD-10-CM

## 2021-03-02 LAB
APPEARANCE UR: CLEAR
APTT BLD: 32.9 SECONDS (ref 24.2–38.5)
BACTERIA UR QL AUTO: NEGATIVE
BASE EXCESS BLDA CALC-SCNC: -1.5 MMOL/L (ref -2–2)
BILIRUB UR QL STRIP.AUTO: NEGATIVE
BUN SERPL-MCNC: 9 MG/DL (ref 7–18)
CALCIUM SERPL-MCNC: 9.3 MG/DL (ref 8.5–10.1)
CHLORIDE SERPL-SCNC: 105 MEQ/L (ref 98–107)
CO2 BLDA CALC-SCNC: 24.6 MEQ/L (ref 22–29)
CO2 SERPL-SCNC: 30 MEQ/L (ref 21–32)
CREAT SERPL-MCNC: 0.98 MG/DL (ref 0.7–1.3)
GFR SERPL CREATININE-BSD FRML MDRD: > 60 ML/MIN/{1.73_M2} (ref 56–?)
GLUCOSE SERPL-MCNC: 134 MG/DL (ref 70–100)
GLUCOSE UR QL STRIP.AUTO: (no result) MG/DL
HCO3 BLDA-SCNC: 23.3 MEQ/L (ref 22–26)
HCO3 STD BLDA-SCNC: 23.2 MEQ/L (ref 22–26)
HCT VFR BLD AUTO: 49.1 % (ref 42–52)
HGB BLD-MCNC: 15.8 G/DL (ref 13.5–17.5)
HGB UR QL STRIP.AUTO: NEGATIVE
INR PPP: 0.92
KETONES UR QL STRIP.AUTO: NEGATIVE MG/DL
LEUKOCYTE ESTERASE UR QL STRIP.AUTO: NEGATIVE
MCH RBC QN AUTO: 29.7 PG (ref 27–33)
MCHC RBC AUTO-ENTMCNC: 32.2 G/DL (ref 32–36.5)
MCV RBC AUTO: 92.3 FL (ref 80–96)
MUCOUS THREADS URNS QL MICRO: (no result)
NITRITE UR QL STRIP.AUTO: NEGATIVE
PCO2 BLDA: 40.1 MMHG (ref 35–45)
PH BLDA: 7.38 UNITS (ref 7.35–7.45)
PH UR STRIP.AUTO: 5 UNITS (ref 5–9)
PLATELET # BLD AUTO: 238 10^3/UL (ref 150–450)
PO2 BLDA: 98 MMHG (ref 75–100)
POTASSIUM SERPL-SCNC: 4.3 MEQ/L (ref 3.5–5.1)
PROT UR QL STRIP.AUTO: NEGATIVE MG/DL
PROTHROMBIN TIME: 12.5 SECONDS (ref 12.5–14.3)
RBC # BLD AUTO: 5.32 10^6/UL (ref 4.3–6.1)
RBC # UR AUTO: 1 /HPF (ref 0–3)
SAO2 % BLDA: 98.1 % (ref 95–99)
SODIUM SERPL-SCNC: 138 MEQ/L (ref 136–145)
SP GR UR STRIP.AUTO: 1.03 (ref 1–1.03)
SQUAMOUS #/AREA URNS AUTO: 0 /HPF (ref 0–6)
UROBILINOGEN UR QL STRIP.AUTO: 0.2 MG/DL (ref 0–2)
WBC # BLD AUTO: 8.9 10^3/UL (ref 4–10)
WBC #/AREA URNS AUTO: 1 /HPF (ref 0–3)

## 2021-03-02 NOTE — ECGEPIP
[facility]

                                       

                                       Test Date:    2021

Pat Name:     KATJA ARENAS        Department:   

Patient ID:   [id number]                 Room:         -

Gender:       Male                     Technician:   KEYANA

:          1965               Requested By: Kye RAMOS

Order Number: [id number]     Reading MD:   Kevin Nava

                                 Measurements

Intervals                              Axis          

Rate:         99                       P:            58

WA:           154                      QRS:          5

QRSD:         94                       T:            31

QT:           366                                    

QTc:          469                                    

                           Interpretive Statements

Normal sinus rhythm

NO CHANGE COMPARED TO 21

Electronically Signed on 3-2-2021 19:42:35 EST by Kevin Nava

## 2021-03-02 NOTE — REP
INDICATION:

PRE OP 23



COMPARISON:

01/11/2021



TECHNIQUE:

PA and lateral.



FINDINGS:

Mediastinum and cardiac silhouette are stable with perihilar opacities suggesting

adenopathy.  Nddfro-F-Zqmq again identified with tip in the SVC.  Lung fields

demonstrate chronic changes.  Area of opacity in the left upper lung zone again

identified and unchanged.  No pneumothorax.  No effusion.  Skeletal structures are

intact.



IMPRESSION:

No significant change from prior examination.





<Electronically signed by Gregory Chua > 03/02/21 [id number]

## 2021-03-03 ENCOUNTER — HOSPITAL ENCOUNTER (OUTPATIENT)
Dept: HOSPITAL 53 - M LABSMTC | Age: 56
End: 2021-03-03
Attending: ANESTHESIOLOGY
Payer: COMMERCIAL

## 2021-03-03 DIAGNOSIS — Z01.812: Primary | ICD-10-CM

## 2021-03-03 DIAGNOSIS — Z20.822: ICD-10-CM

## 2021-03-08 ENCOUNTER — HOSPITAL ENCOUNTER (OUTPATIENT)
Dept: HOSPITAL 53 - M OR | Age: 56
End: 2021-03-08
Attending: THORACIC SURGERY (CARDIOTHORACIC VASCULAR SURGERY)
Payer: COMMERCIAL

## 2021-03-08 VITALS — BODY MASS INDEX: 41.75 KG/M2 | HEIGHT: 73 IN | WEIGHT: 315 LBS

## 2021-03-08 VITALS — DIASTOLIC BLOOD PRESSURE: 84 MMHG | SYSTOLIC BLOOD PRESSURE: 144 MMHG

## 2021-03-08 DIAGNOSIS — Z53.09: ICD-10-CM

## 2021-03-08 DIAGNOSIS — R91.1: Primary | ICD-10-CM

## 2021-03-08 LAB
CLOSURE TME COLL+ADP BLD: 109 SECONDS (ref 56–103)
CLOSURE TME COLL+EPINEP BLD: 211 SECONDS (ref 74–162)

## 2021-03-12 ENCOUNTER — HOSPITAL ENCOUNTER (OUTPATIENT)
Dept: HOSPITAL 53 - M LABSMTC | Age: 56
End: 2021-03-12
Attending: ANESTHESIOLOGY
Payer: COMMERCIAL

## 2021-03-12 DIAGNOSIS — Z20.822: ICD-10-CM

## 2021-03-12 DIAGNOSIS — Z01.812: Primary | ICD-10-CM

## 2021-03-15 ENCOUNTER — HOSPITAL ENCOUNTER (INPATIENT)
Dept: HOSPITAL 53 - M OR | Age: 56
LOS: 5 days | Discharge: HOME | DRG: 164 | End: 2021-03-20
Attending: THORACIC SURGERY (CARDIOTHORACIC VASCULAR SURGERY) | Admitting: THORACIC SURGERY (CARDIOTHORACIC VASCULAR SURGERY)
Payer: COMMERCIAL

## 2021-03-15 VITALS — HEIGHT: 73 IN | BODY MASS INDEX: 41.75 KG/M2 | WEIGHT: 315 LBS

## 2021-03-15 VITALS — SYSTOLIC BLOOD PRESSURE: 112 MMHG | DIASTOLIC BLOOD PRESSURE: 63 MMHG

## 2021-03-15 VITALS — SYSTOLIC BLOOD PRESSURE: 132 MMHG | DIASTOLIC BLOOD PRESSURE: 55 MMHG

## 2021-03-15 VITALS — DIASTOLIC BLOOD PRESSURE: 69 MMHG | SYSTOLIC BLOOD PRESSURE: 138 MMHG

## 2021-03-15 VITALS — OXYGEN SATURATION: 97 % | DIASTOLIC BLOOD PRESSURE: 55 MMHG | SYSTOLIC BLOOD PRESSURE: 110 MMHG

## 2021-03-15 VITALS — SYSTOLIC BLOOD PRESSURE: 132 MMHG | DIASTOLIC BLOOD PRESSURE: 69 MMHG

## 2021-03-15 VITALS — DIASTOLIC BLOOD PRESSURE: 69 MMHG | SYSTOLIC BLOOD PRESSURE: 147 MMHG

## 2021-03-15 VITALS — DIASTOLIC BLOOD PRESSURE: 59 MMHG | SYSTOLIC BLOOD PRESSURE: 121 MMHG

## 2021-03-15 VITALS — DIASTOLIC BLOOD PRESSURE: 68 MMHG | SYSTOLIC BLOOD PRESSURE: 136 MMHG

## 2021-03-15 DIAGNOSIS — E11.9: ICD-10-CM

## 2021-03-15 DIAGNOSIS — G47.33: ICD-10-CM

## 2021-03-15 DIAGNOSIS — J44.9: ICD-10-CM

## 2021-03-15 DIAGNOSIS — I25.10: ICD-10-CM

## 2021-03-15 DIAGNOSIS — Z79.02: ICD-10-CM

## 2021-03-15 DIAGNOSIS — Z95.5: ICD-10-CM

## 2021-03-15 DIAGNOSIS — C78.02: Primary | ICD-10-CM

## 2021-03-15 DIAGNOSIS — E78.5: ICD-10-CM

## 2021-03-15 DIAGNOSIS — Z53.31: ICD-10-CM

## 2021-03-15 DIAGNOSIS — Z86.16: ICD-10-CM

## 2021-03-15 DIAGNOSIS — Z87.891: ICD-10-CM

## 2021-03-15 DIAGNOSIS — E66.01: ICD-10-CM

## 2021-03-15 DIAGNOSIS — Z85.038: ICD-10-CM

## 2021-03-15 DIAGNOSIS — I10: ICD-10-CM

## 2021-03-15 LAB
BASE EXCESS BLDA CALC-SCNC: -4 MMOL/L (ref -2–2)
BASE EXCESS BLDA CALC-SCNC: -5.7 MMOL/L (ref -2–2)
BASE EXCESS BLDA CALC-SCNC: -7.7 MMOL/L (ref -2–2)
BASOPHILS # BLD AUTO: 0.1 10^3/UL (ref 0–0.2)
BASOPHILS NFR BLD AUTO: 0.4 % (ref 0–1)
BUN SERPL-MCNC: 16 MG/DL (ref 7–18)
CALCIUM SERPL-MCNC: 8.7 MG/DL (ref 8.5–10.1)
CHLORIDE SERPL-SCNC: 109 MEQ/L (ref 98–107)
CO2 BLDA CALC-SCNC: 21 MEQ/L (ref 22–29)
CO2 BLDA CALC-SCNC: 22 MEQ/L (ref 22–29)
CO2 BLDA CALC-SCNC: 23.4 MEQ/L (ref 22–29)
CO2 SERPL-SCNC: 23 MEQ/L (ref 21–32)
CREAT SERPL-MCNC: 1.04 MG/DL (ref 0.7–1.3)
EOSINOPHIL # BLD AUTO: 0 10^3/UL (ref 0–0.5)
EOSINOPHIL NFR BLD AUTO: 0.2 % (ref 0–3)
GFR SERPL CREATININE-BSD FRML MDRD: > 60 ML/MIN/{1.73_M2} (ref 56–?)
GLUCOSE SERPL-MCNC: 246 MG/DL (ref 70–100)
HCO3 BLDA-SCNC: 19.6 MEQ/L (ref 22–26)
HCO3 BLDA-SCNC: 20.7 MEQ/L (ref 22–26)
HCO3 BLDA-SCNC: 22.1 MEQ/L (ref 22–26)
HCO3 STD BLDA-SCNC: 18.3 MEQ/L (ref 22–26)
HCO3 STD BLDA-SCNC: 19.8 MEQ/L (ref 22–26)
HCO3 STD BLDA-SCNC: 21.2 MEQ/L (ref 22–26)
HCT VFR BLD AUTO: 47.3 % (ref 42–52)
HCT VFR BLD AUTO: 48.2 % (ref 42–52)
HGB BLD-MCNC: 15.6 G/DL (ref 13.5–17.5)
HGB BLD-MCNC: 16.2 G/DL (ref 13.5–17.5)
INHALED O2 FLOW RATE: 4 L/MIN
INHALED O2 FLOW RATE: 4 L/MIN
LYMPHOCYTES # BLD AUTO: 1.4 10^3/UL (ref 1.5–5)
LYMPHOCYTES NFR BLD AUTO: 8.8 % (ref 24–44)
MCH RBC QN AUTO: 30.5 PG (ref 27–33)
MCH RBC QN AUTO: 31.1 PG (ref 27–33)
MCHC RBC AUTO-ENTMCNC: 33 G/DL (ref 32–36.5)
MCHC RBC AUTO-ENTMCNC: 33.6 G/DL (ref 32–36.5)
MCV RBC AUTO: 92.5 FL (ref 80–96)
MCV RBC AUTO: 92.6 FL (ref 80–96)
MONOCYTES # BLD AUTO: 0.4 10^3/UL (ref 0–0.8)
MONOCYTES NFR BLD AUTO: 2.3 % (ref 2–8)
NEUTROPHILS # BLD AUTO: 13.7 10^3/UL (ref 1.5–8.5)
NEUTROPHILS NFR BLD AUTO: 87.3 % (ref 36–66)
PCO2 BLDA: 43.4 MMHG (ref 35–45)
PCO2 BLDA: 43.6 MMHG (ref 35–45)
PCO2 BLDA: 46.2 MMHG (ref 35–45)
PH BLDA: 7.25 UNITS (ref 7.35–7.45)
PH BLDA: 7.3 UNITS (ref 7.35–7.45)
PH BLDA: 7.32 UNITS (ref 7.35–7.45)
PLATELET # BLD AUTO: 183 10^3/UL (ref 150–450)
PLATELET # BLD AUTO: 231 10^3/UL (ref 150–450)
PO2 BLDA: 74.3 MMHG (ref 75–100)
PO2 BLDA: 75.5 MMHG (ref 75–100)
PO2 BLDA: 85.2 MMHG (ref 75–100)
POTASSIUM SERPL-SCNC: 4.6 MEQ/L (ref 3.5–5.1)
RBC # BLD AUTO: 5.11 10^6/UL (ref 4.3–6.1)
RBC # BLD AUTO: 5.21 10^6/UL (ref 4.3–6.1)
SAO2 % BLDA: 94.8 % (ref 95–99)
SAO2 % BLDA: 95.8 % (ref 95–99)
SAO2 % BLDA: 96.1 % (ref 95–99)
SODIUM SERPL-SCNC: 140 MEQ/L (ref 136–145)
WBC # BLD AUTO: 15.7 10^3/UL (ref 4–10)
WBC # BLD AUTO: 6.9 10^3/UL (ref 4–10)

## 2021-03-15 PROCEDURE — 0B9G8ZZ DRAINAGE OF LEFT UPPER LUNG LOBE, VIA NATURAL OR ARTIFICIAL OPENING ENDOSCOPIC: ICD-10-PCS | Performed by: THORACIC SURGERY (CARDIOTHORACIC VASCULAR SURGERY)

## 2021-03-15 PROCEDURE — 0BBG0ZX EXCISION OF LEFT UPPER LUNG LOBE, OPEN APPROACH, DIAGNOSTIC: ICD-10-PCS | Performed by: THORACIC SURGERY (CARDIOTHORACIC VASCULAR SURGERY)

## 2021-03-15 RX ADMIN — PANTOPRAZOLE SODIUM SCH MG: 40 TABLET, DELAYED RELEASE ORAL at 16:58

## 2021-03-15 RX ADMIN — KETOROLAC TROMETHAMINE SCH MG: 30 INJECTION, SOLUTION INTRAMUSCULAR at 20:59

## 2021-03-15 RX ADMIN — SODIUM CHLORIDE SCH UNITS: 4.5 INJECTION, SOLUTION INTRAVENOUS at 20:59

## 2021-03-15 RX ADMIN — Medication SCH MLS/HR: at 14:21

## 2021-03-15 RX ADMIN — MAGNESIUM HYDROXIDE SCH ML: 400 SUSPENSION ORAL at 16:55

## 2021-03-15 RX ADMIN — DOCUSATE SODIUM SCH MG: 100 CAPSULE, LIQUID FILLED ORAL at 20:56

## 2021-03-15 RX ADMIN — LEVALBUTEROL HYDROCHLORIDE SCH MG: 1.25 SOLUTION, CONCENTRATE RESPIRATORY (INHALATION) at 19:51

## 2021-03-15 RX ADMIN — KETOROLAC TROMETHAMINE SCH MG: 30 INJECTION, SOLUTION INTRAMUSCULAR at 16:58

## 2021-03-15 RX ADMIN — CEFAZOLIN SODIUM SCH MLS/HR: 1 INJECTION, POWDER, FOR SOLUTION INTRAMUSCULAR; INTRAVENOUS at 16:57

## 2021-03-15 NOTE — REP
INDICATION:

lung wedge resection left.



COMPARISON:

Comparison chest x-ray March 2, 2021.



TECHNIQUE:

Portable upright AP chest radiograph.



FINDINGS:

Two left-sided chest tubes are noted in place.  A left-sided Paojxo-A-Hdra catheter is

visible.  There is left perihilar opacity consistent with atelectasis in the left

upper lobe distribution.



On the right there is a bandlike density along the course of the minor fissure

suggesting right middle lobe collapse.  Soft tissue emphysema is seen along the right

lateral chest wall and in the soft tissues of the right neck.  An epidural catheter is

noted along with EKG monitoring electrodes.



IMPRESSION:

Findings suggest right middle lobe collapse.  Left perihilar atelectatic changes.  Two

left chest tubes in place.  Extra thoracic soft tissue air on the right..





<Electronically signed by John Patel > 03/15/21 [id number]

## 2021-03-15 NOTE — RO
OPERATIVE NOTE



DATE OF OPERATION: 03/15/2021



PREPROCEDURE DIAGNOSIS: Left upper lobe lung lesion. 



POSTPROCEDURE DIAGNOSIS: Left upper lobe lung lesion with resection showing

metastatic colon cancer. 



PROCEDURE: Thoracoscopy followed by thoracotomy and wedge resection left upper

lobe, five level rib block, and bronchoscopy. 



SURGEON: Kye Lyon M.D. 



ASSISTANT: 



ANESTHESIA: 



FINDINGS: Bronchoscopy revealed a normal branching tracheobronchial tree. There

were no endobronchial lesions. There were copious amounts of secretions, which

were cleared with suction and aspiration. The thoracoscopy revealed a complete

fissure, but I could not see the lesion in the left upper lobe as it was deep

to the pleura and rather central. I therefore had to convert to a thoracotomy,

where the lesion was found to be quite discrete, nodular, and almost spherical.

This was able to be wedged out with axillary incisions. The thoracotomy was

quite difficult secondary to his size of 157 kg or 345 pounds. The closure was

also as difficult. 



DESCRIPTION OF PROCEDURE: Under satisfactory general anesthesia and

single-lumen tube endotracheal intubation, bronchoscope was placed into the

tracheobronchial tree. The above findings were noted. Segmental and

subsegmental bronchus was thoroughly inspected and there were no endobronchial

lesions seen. There were copious amounts of secretions, which had to be suction

aspirated. The patient then underwent double-lumen tube endotracheal intubation

with the position confirmed with bronchoscopy. He was then turned into the

right lateral decubitus position. This took numerous personnel in the operating

room to do so. The patient was then prepped and draped in the usual sterile

fashion and a posterolateral thoracotomy incision was made. The patient was not

only morbidly obese, but had hypertrophied postural muscles. The tissue was

quite adhesive and dense. Ribs were counted and his chest was entered through

the fifth intercostal space just above the sixth rib. This also took quite a

bit of manipulation. Finally, the lung could be well visualized. The fissure

was complete and I could then palpate the lesion, it is as described above.

Through an axillary incision, an [employer] PEREZ stapler could be placed, and the

entire lesion could be wedged out without destroying potential dissection

planes. Frozen section, however, revealed this to be metastatic colon carcinoma

and therefore, the planned lobectomy was abandoned. The lung was thoroughly

palpated to look for any other lesions of which I could find none. There were

no enlarged lymph nodes in the AP window and I did not dissect them, as this

turned into a metastectomy. Two chest tubes were placed #24 straight and

curved anterior and posterolaterally respectively. Five level rib block

consisting of marcaine and Exparel were then instilled and the ribs were

reapproximated with six #1 figure-of-eight Prolene sutures. The extrathoracic

muscles with running 0-Vicryl suture. The subcutaneous tissue was closed with

running 3-0 Vicryl suture and the skin closed with running 3-0 Monocryl

subcuticular suture. Prior to final closure, the muscle layers were infiltrated

with Exparel and marcaine mixture. It should be noted that not only was the

latissimus dorsi divided in order to expose the chest, the serratus anterior

was also divided. The patient tolerated the procedure well and left the

operating room in satisfactory condition to the recovery room. 

VAISHNAVI

## 2021-03-16 VITALS — DIASTOLIC BLOOD PRESSURE: 55 MMHG | SYSTOLIC BLOOD PRESSURE: 101 MMHG

## 2021-03-16 VITALS — SYSTOLIC BLOOD PRESSURE: 117 MMHG | DIASTOLIC BLOOD PRESSURE: 78 MMHG

## 2021-03-16 VITALS — SYSTOLIC BLOOD PRESSURE: 108 MMHG | DIASTOLIC BLOOD PRESSURE: 55 MMHG

## 2021-03-16 VITALS — SYSTOLIC BLOOD PRESSURE: 137 MMHG | DIASTOLIC BLOOD PRESSURE: 55 MMHG

## 2021-03-16 VITALS — SYSTOLIC BLOOD PRESSURE: 130 MMHG | DIASTOLIC BLOOD PRESSURE: 58 MMHG

## 2021-03-16 VITALS — SYSTOLIC BLOOD PRESSURE: 122 MMHG | DIASTOLIC BLOOD PRESSURE: 67 MMHG

## 2021-03-16 VITALS — DIASTOLIC BLOOD PRESSURE: 56 MMHG | SYSTOLIC BLOOD PRESSURE: 106 MMHG

## 2021-03-16 VITALS — SYSTOLIC BLOOD PRESSURE: 127 MMHG | DIASTOLIC BLOOD PRESSURE: 70 MMHG

## 2021-03-16 LAB
BASE EXCESS BLDA CALC-SCNC: -0.3 MMOL/L (ref -2–2)
BASOPHILS # BLD AUTO: 0 10^3/UL (ref 0–0.2)
BASOPHILS NFR BLD AUTO: 0.2 % (ref 0–1)
BUN SERPL-MCNC: 18 MG/DL (ref 7–18)
CALCIUM SERPL-MCNC: 8.4 MG/DL (ref 8.5–10.1)
CHLORIDE SERPL-SCNC: 106 MEQ/L (ref 98–107)
CO2 BLDA CALC-SCNC: 25.9 MEQ/L (ref 22–29)
CO2 SERPL-SCNC: 27 MEQ/L (ref 21–32)
CREAT SERPL-MCNC: 0.95 MG/DL (ref 0.7–1.3)
EOSINOPHIL # BLD AUTO: 0.1 10^3/UL (ref 0–0.5)
EOSINOPHIL NFR BLD AUTO: 0.5 % (ref 0–3)
GFR SERPL CREATININE-BSD FRML MDRD: > 60 ML/MIN/{1.73_M2} (ref 56–?)
GLUCOSE SERPL-MCNC: 170 MG/DL (ref 70–100)
HCO3 BLDA-SCNC: 24.7 MEQ/L (ref 22–26)
HCO3 STD BLDA-SCNC: 24.2 MEQ/L (ref 22–26)
HCT VFR BLD AUTO: 42.6 % (ref 42–52)
HGB BLD-MCNC: 14 G/DL (ref 13.5–17.5)
LYMPHOCYTES # BLD AUTO: 1.4 10^3/UL (ref 1.5–5)
LYMPHOCYTES NFR BLD AUTO: 10.8 % (ref 24–44)
MCH RBC QN AUTO: 30.6 PG (ref 27–33)
MCHC RBC AUTO-ENTMCNC: 32.9 G/DL (ref 32–36.5)
MCV RBC AUTO: 93.2 FL (ref 80–96)
MONOCYTES # BLD AUTO: 1 10^3/UL (ref 0–0.8)
MONOCYTES NFR BLD AUTO: 8.3 % (ref 2–8)
NEUTROPHILS # BLD AUTO: 10 10^3/UL (ref 1.5–8.5)
NEUTROPHILS NFR BLD AUTO: 79.9 % (ref 36–66)
PCO2 BLDA: 41.4 MMHG (ref 35–45)
PH BLDA: 7.39 UNITS (ref 7.35–7.45)
PLATELET # BLD AUTO: 184 10^3/UL (ref 150–450)
PO2 BLDA: 78.6 MMHG (ref 75–100)
POTASSIUM SERPL-SCNC: 3.8 MEQ/L (ref 3.5–5.1)
RBC # BLD AUTO: 4.57 10^6/UL (ref 4.3–6.1)
SAO2 % BLDA: 96.6 % (ref 95–99)
SODIUM SERPL-SCNC: 139 MEQ/L (ref 136–145)
WBC # BLD AUTO: 12.6 10^3/UL (ref 4–10)

## 2021-03-16 RX ADMIN — CEFAZOLIN SODIUM SCH MLS/HR: 1 INJECTION, POWDER, FOR SOLUTION INTRAMUSCULAR; INTRAVENOUS at 09:04

## 2021-03-16 RX ADMIN — CEFAZOLIN SODIUM SCH MLS/HR: 1 INJECTION, POWDER, FOR SOLUTION INTRAMUSCULAR; INTRAVENOUS at 18:11

## 2021-03-16 RX ADMIN — KETOROLAC TROMETHAMINE SCH MG: 30 INJECTION, SOLUTION INTRAMUSCULAR at 04:06

## 2021-03-16 RX ADMIN — MAGNESIUM HYDROXIDE SCH ML: 400 SUSPENSION ORAL at 09:00

## 2021-03-16 RX ADMIN — LEVALBUTEROL HYDROCHLORIDE SCH MG: 1.25 SOLUTION, CONCENTRATE RESPIRATORY (INHALATION) at 01:08

## 2021-03-16 RX ADMIN — PANTOPRAZOLE SODIUM SCH MG: 40 TABLET, DELAYED RELEASE ORAL at 09:04

## 2021-03-16 RX ADMIN — KETOROLAC TROMETHAMINE SCH MG: 30 INJECTION, SOLUTION INTRAMUSCULAR at 15:54

## 2021-03-16 RX ADMIN — LEVALBUTEROL HYDROCHLORIDE SCH MG: 1.25 SOLUTION, CONCENTRATE RESPIRATORY (INHALATION) at 19:38

## 2021-03-16 RX ADMIN — KETOROLAC TROMETHAMINE SCH MG: 30 INJECTION, SOLUTION INTRAMUSCULAR at 21:22

## 2021-03-16 RX ADMIN — DOCUSATE SODIUM SCH MG: 100 CAPSULE, LIQUID FILLED ORAL at 09:04

## 2021-03-16 RX ADMIN — SODIUM CHLORIDE SCH UNITS: 4.5 INJECTION, SOLUTION INTRAVENOUS at 09:05

## 2021-03-16 RX ADMIN — KETOROLAC TROMETHAMINE SCH MG: 30 INJECTION, SOLUTION INTRAMUSCULAR at 10:08

## 2021-03-16 RX ADMIN — LEVALBUTEROL HYDROCHLORIDE SCH MG: 1.25 SOLUTION, CONCENTRATE RESPIRATORY (INHALATION) at 07:14

## 2021-03-16 RX ADMIN — SODIUM CHLORIDE SCH UNITS: 4.5 INJECTION, SOLUTION INTRAVENOUS at 21:21

## 2021-03-16 RX ADMIN — CEFAZOLIN SODIUM SCH MLS/HR: 1 INJECTION, POWDER, FOR SOLUTION INTRAMUSCULAR; INTRAVENOUS at 00:28

## 2021-03-16 RX ADMIN — LEVALBUTEROL HYDROCHLORIDE SCH MG: 1.25 SOLUTION, CONCENTRATE RESPIRATORY (INHALATION) at 14:06

## 2021-03-16 RX ADMIN — DOCUSATE SODIUM SCH MG: 100 CAPSULE, LIQUID FILLED ORAL at 21:21

## 2021-03-16 RX ADMIN — Medication SCH MLS/HR: at 14:16

## 2021-03-16 NOTE — IPN
PROGRESS NOTE



DATE:  03/16/2021



SUBJECTIVE: This is now the first postoperative day for Mr. Bose status

post a thoracotomy and wedge resection. The pathology has been confirmed as

metastatic colon carcinoma. 



He has had a fairly comfortable night. I am gratified that his pain is only

between a 3 and a 4.



OBJECTIVE: 

VITAL SIGNS: Show a T-max of 98.6 with a heart rate that ranges between 107 and

94 in sinus rhythm. Respiratory rate of 18 to 22 without the use of accessory

muscles who is 92% to 94% saturated on 2 liters nasal cannula and whose blood

pressure is ranging between 137/71 to 96/55. 

INTAKE AND OUTPUT: Over the past 24 hours has been recorded as 1767 in and 1440

out for a positivity of 320 mL. He has put out 145 mL from the chest tube and

there is no air leak. Weight today is 162.7 kg. 

RESPIRATORY: He has equal breath sounds on either side. There are some

inspiratory rales faintly heard. Percussion notes are full to the diaphragm. 

CARDIAC: Without murmurs, clicks, gallops, or rubs. I cannot feel his PMI. S1

and S2 are normal. 

ABDOMEN: Soft and nontender. Bowel sounds are positive. There is no

hepatomegaly that I can appreciate through his obesity. 

EXTREMITIES: Show no pretibial edema. No calf tenderness. No differential

swelling of the upper extremities. 

SKIN: Warm, dry, and perfused without cyanosis or mottling, including that of

the nail beds and knees. 

NECK: Supple. There is no jugular venous distention. No subcutaneous emphysema.

Trachea is midline.

MOUTH: Shows the mucous membranes to be pink and moist. Lips and commisures are

without lesions and no thrush.

EYES: Show his pupils equal and reactive. Extraocular movements are intact.

Sclerae nonicteric. 

NEUROLOGIC: Shows II through XII intact. Normal gross motor, gross sensation

intact. Gait is not tested. 

PSYCHIATRIC: Shows him to be awake, alert, and oriented x3 with appropriate

mood and affect and conversational. 



LABORATORY DATA: His white count today is 12.6 with a hemoglobin and hematocrit

of 14.0 and 42.6 respectively. Platelet count 184,000 stable. Differential

shows 79% neutrophils, 10% lymphocytes, and 6% monocytes. There are no immature

forms and no toxic granulations. Electrolytes are normal with a BUN and

creatinine of 18 and 0.95, glucose of 170, and calcium 8.4. Blood gases today

have normalized with a pH of 7.39, pCO2 of 41, and a pO2 of 78 on 2 liters

nasal cannula with a base excess of -0.3. 



IMAGING DATA: His chest x-ray shows his lungs fully expand to the chest wall.

Atelectasis on the right side has now resolved from yesterday. Chest tubes are

in good place. There are no posterior infiltrates on the lateral film. 



IMPRESSION:

1.  Metastatic colon carcinoma to the lung. 

2.  Postoperative day #1 status post thoracotomy with wedge resection.

3.  Obesity. 

4.  Chronic obstructive pulmonary disease (COPD). 

5.  Hyperlipidemia. 

6.  Coronary artery disease previously on Plavix. 

7.  Hypertension.

8.  Diabetes. 



PLAN AND DISCUSSION: I will transfer him to the progressive care unit (PCU)

today. We will discontinue his arterial line. He is doing gratifyingly quite

well particularly with his pain control. We will continue his chest tube on

suction for now even though there is no air leak.

## 2021-03-16 NOTE — REP
INDICATION:

lung wedge resection left.



COMPARISON:

Portable chest dated 03/15/2021.



TECHNIQUE:

PA and lateral chest.



FINDINGS:

The 2 left thoracotomy tubes are unchanged.  There is no pneumothorax.  There is focal

increased density in the left suprahilar area, likely atelectasis.

There is a left subclavian Abqicd-Z-Fiyq catheter with the tip in the right atrium in

satisfactory location, unchanged.

There is an epidural catheter, unchanged.

There is faintly visible density outlining the superior margin of the right minor

fissure suggestive an infiltrate inferiorly in the right upper lobe.

There is subcutaneous emphysema in the soft tissues at the base of the neck on the

right and in the soft tissues lateral to the liver and right costophrenic angle on the

right.





IMPRESSION:

Probable infiltrate/atelectasis in the left suprahilar area.

Probable infiltrate inferiorly in the right upper lobe.

No pneumothorax.

Subcutaneous emphysema at the base of the neck on the right and lateral to the liver

and right costophrenic angle on the right.

Two left thoracotomy tubes,  epidural catheter and Nnhrct-A-Noqo catheter are

unchanged.





<Electronically signed by Gustabo Choi > 03/16/21 0853

## 2021-03-17 VITALS — DIASTOLIC BLOOD PRESSURE: 72 MMHG | SYSTOLIC BLOOD PRESSURE: 130 MMHG

## 2021-03-17 VITALS — OXYGEN SATURATION: 92 %

## 2021-03-17 VITALS — OXYGEN SATURATION: 91 %

## 2021-03-17 VITALS — DIASTOLIC BLOOD PRESSURE: 63 MMHG | OXYGEN SATURATION: 92 % | SYSTOLIC BLOOD PRESSURE: 120 MMHG

## 2021-03-17 VITALS — SYSTOLIC BLOOD PRESSURE: 148 MMHG | OXYGEN SATURATION: 91 % | DIASTOLIC BLOOD PRESSURE: 65 MMHG

## 2021-03-17 VITALS — DIASTOLIC BLOOD PRESSURE: 82 MMHG | SYSTOLIC BLOOD PRESSURE: 147 MMHG

## 2021-03-17 VITALS — SYSTOLIC BLOOD PRESSURE: 158 MMHG | DIASTOLIC BLOOD PRESSURE: 77 MMHG

## 2021-03-17 VITALS — OXYGEN SATURATION: 93 %

## 2021-03-17 VITALS — OXYGEN SATURATION: 94 %

## 2021-03-17 VITALS — DIASTOLIC BLOOD PRESSURE: 82 MMHG | SYSTOLIC BLOOD PRESSURE: 124 MMHG

## 2021-03-17 VITALS — SYSTOLIC BLOOD PRESSURE: 135 MMHG | DIASTOLIC BLOOD PRESSURE: 68 MMHG

## 2021-03-17 VITALS — OXYGEN SATURATION: 90 %

## 2021-03-17 LAB
BASOPHILS # BLD AUTO: 0 10^3/UL (ref 0–0.2)
BASOPHILS NFR BLD AUTO: 0.3 % (ref 0–1)
BUN SERPL-MCNC: 18 MG/DL (ref 7–18)
CALCIUM SERPL-MCNC: 8.5 MG/DL (ref 8.5–10.1)
CHLORIDE SERPL-SCNC: 105 MEQ/L (ref 98–107)
CO2 SERPL-SCNC: 28 MEQ/L (ref 21–32)
CREAT SERPL-MCNC: 0.8 MG/DL (ref 0.7–1.3)
EOSINOPHIL # BLD AUTO: 0.3 10^3/UL (ref 0–0.5)
EOSINOPHIL NFR BLD AUTO: 3.3 % (ref 0–3)
GFR SERPL CREATININE-BSD FRML MDRD: > 60 ML/MIN/{1.73_M2} (ref 56–?)
GLUCOSE SERPL-MCNC: 201 MG/DL (ref 70–100)
HCT VFR BLD AUTO: 41.9 % (ref 42–52)
HGB BLD-MCNC: 13.7 G/DL (ref 13.5–17.5)
LYMPHOCYTES # BLD AUTO: 1.2 10^3/UL (ref 1.5–5)
LYMPHOCYTES NFR BLD AUTO: 11.7 % (ref 24–44)
MCH RBC QN AUTO: 30.8 PG (ref 27–33)
MCHC RBC AUTO-ENTMCNC: 32.7 G/DL (ref 32–36.5)
MCV RBC AUTO: 94.2 FL (ref 80–96)
MONOCYTES # BLD AUTO: 0.8 10^3/UL (ref 0–0.8)
MONOCYTES NFR BLD AUTO: 7.7 % (ref 2–8)
NEUTROPHILS # BLD AUTO: 7.9 10^3/UL (ref 1.5–8.5)
NEUTROPHILS NFR BLD AUTO: 76.7 % (ref 36–66)
PLATELET # BLD AUTO: 153 10^3/UL (ref 150–450)
POTASSIUM SERPL-SCNC: 3.6 MEQ/L (ref 3.5–5.1)
RBC # BLD AUTO: 4.45 10^6/UL (ref 4.3–6.1)
SODIUM SERPL-SCNC: 138 MEQ/L (ref 136–145)
WBC # BLD AUTO: 10.2 10^3/UL (ref 4–10)

## 2021-03-17 RX ADMIN — KETOROLAC TROMETHAMINE SCH MG: 30 INJECTION, SOLUTION INTRAMUSCULAR at 16:52

## 2021-03-17 RX ADMIN — KETOROLAC TROMETHAMINE SCH MG: 30 INJECTION, SOLUTION INTRAMUSCULAR at 09:02

## 2021-03-17 RX ADMIN — LEVALBUTEROL HYDROCHLORIDE SCH MG: 1.25 SOLUTION, CONCENTRATE RESPIRATORY (INHALATION) at 02:31

## 2021-03-17 RX ADMIN — Medication SCH MLS/HR: at 14:45

## 2021-03-17 RX ADMIN — DOCUSATE SODIUM SCH MG: 100 CAPSULE, LIQUID FILLED ORAL at 09:02

## 2021-03-17 RX ADMIN — KETOROLAC TROMETHAMINE SCH MG: 30 INJECTION, SOLUTION INTRAMUSCULAR at 04:00

## 2021-03-17 RX ADMIN — KETOROLAC TROMETHAMINE SCH MG: 30 INJECTION, SOLUTION INTRAMUSCULAR at 21:18

## 2021-03-17 RX ADMIN — MAGNESIUM HYDROXIDE SCH ML: 400 SUSPENSION ORAL at 09:00

## 2021-03-17 RX ADMIN — LEVALBUTEROL HYDROCHLORIDE SCH MG: 1.25 SOLUTION, CONCENTRATE RESPIRATORY (INHALATION) at 07:50

## 2021-03-17 RX ADMIN — LEVALBUTEROL HYDROCHLORIDE SCH MG: 1.25 SOLUTION, CONCENTRATE RESPIRATORY (INHALATION) at 14:04

## 2021-03-17 RX ADMIN — DOCUSATE SODIUM SCH MG: 100 CAPSULE, LIQUID FILLED ORAL at 21:17

## 2021-03-17 RX ADMIN — CEFAZOLIN SODIUM SCH MLS/HR: 1 INJECTION, POWDER, FOR SOLUTION INTRAMUSCULAR; INTRAVENOUS at 09:03

## 2021-03-17 RX ADMIN — LEVALBUTEROL HYDROCHLORIDE SCH MG: 1.25 SOLUTION, CONCENTRATE RESPIRATORY (INHALATION) at 20:10

## 2021-03-17 RX ADMIN — CEFAZOLIN SODIUM SCH MLS/HR: 1 INJECTION, POWDER, FOR SOLUTION INTRAMUSCULAR; INTRAVENOUS at 00:59

## 2021-03-17 RX ADMIN — SODIUM CHLORIDE SCH UNITS: 4.5 INJECTION, SOLUTION INTRAVENOUS at 21:17

## 2021-03-17 RX ADMIN — PANTOPRAZOLE SODIUM SCH MG: 40 TABLET, DELAYED RELEASE ORAL at 09:00

## 2021-03-17 RX ADMIN — SODIUM CHLORIDE SCH UNITS: 4.5 INJECTION, SOLUTION INTRAVENOUS at 09:00

## 2021-03-17 NOTE — REP
INDICATION:

lung wedge resection left



COMPARISON:

03/16/2021



TECHNIQUE:

PA and lateral.



FINDINGS:

A small left apical pneumothorax is identified and similar to prior examination..



Postsurgical changes involving the left hemithorax including stable chest tubes along

with subtle scattered airspace disease again noted and unchanged.



Subtle right-sided opacities cannot be excluded as well.  No obvious effusion.

Mediastinum and cardiac silhouette are within normal limits and stable.



IMPRESSION:

1.  Small stable left apical pneumothorax along with subtle suspected bilateral

airspace disease.





<Electronically signed by Gregory Chua > 03/17/21 0742

## 2021-03-17 NOTE — IPN
PROGRESS NOTE



DATE:  03/17/2021



This is now the second postoperative day for Mr. Bose. His pain is only a

2, and I am quite gratified at his pain control considering his incision. 



His vital signs show a maximum temperature of 97.3 with a heart rate that

ranges between 103-107 in a sinus rhythm, respiratory rate of 19-20 without the

use of accessory muscles, who is 95%-94% saturated on 2 liters nasal cannula

and whose blood pressure is ranging between 147/82 to 130/72.



His intake and output for the past 24 hours has been recorded as 2000 in and

1280 out for a positivity of 720 mL. He has put out 170 mL from the chest tube,

and there is no air leak. He weighs 164.5 kg today compared to 162.7 kg

yesterday.



PHYSICAL EXAMINATION: He has equal breath sounds on either side with scattered

rhonchi on both sides. Percussion notes are full to the diaphragm. Cardiac exam

is without murmurs, clicks, gallops, or rubs.  I cannot feel his point of

maximal impulse (PMI). S1 and S2 are normal. Abdomen is soft and nontender but

tympanitic and slightly distended. He is having flatus but has not had a bowel

movement. I cannot appreciate hepatomegaly through his obesity. Extremities

show no pretibial edema, no calf tenderness, no differential swelling of the

upper extremities. Skin is warm, dry, and perfused without cyanosis or

mottling, including that of the nailbeds and knees. Neck is supple. There is no

jugular venous distention. No subcutaneous emphysema. Trachea is midline. Mouth

shows the mucous membranes to be pink and moist. Lips and commissures without

lesions. No thrush. Eyes show his pupils to be equal and reactive. Extraocular

motion intact. Sclerae anicteric. Neurologic shows II-XII intact. Normal gross

motor, gross sensation intact. Gait is not tested. Psychiatric shows hm to be

awake, alert, and oriented times three with appropriate mood and affect and

conversational.



His white count today is 10.2 with a hemoglobin and hematocrit of 13.7 and

41.9, respectively. Platelet count 153 and stable, and differential shows 76%

neutrophils, 11% lymphocytes, 7% monocytes. There are no immature forms or

toxic granulations.



His electrolytes are normal with a BUN and creatinine of 18 and 0.8, glucose of

201, and calcium of 8.5. He remains or Toradol.



His chest x-ray shows his lung fully expanded to the chest wall. There is fluid

in the left major fissure. Costophrenic angles are sharp, and chest tubes are

in good place. There is no subcutaneous emphysema.



IMPRESSION: 

1. Metastatic colon carcinoma to the lung.

2. Postoperative day #2 status post thoracotomy and wedge resection.

3. Obesity.

4. Chronic obstructive pulmonary disease.

5. Hyperlipidemia. 

6. Coronary artery disease, previously on Plavix.

7. Hypertension.

8. Diabetes. 



PLAN AND DISCUSSION: I will discontinue his chest tube suction today, as he has

no air leak. I am incredibly gratified at his pain control. The real test of

pain control is going to be when we wean the epidural, most likely tomorrow or

the next day. Yesterday I informed the patient of his diagnosis being

metastatic colon carcinoma and not primary lung cancer and therefore did not

need to undergo lobectomy.

## 2021-03-18 VITALS — DIASTOLIC BLOOD PRESSURE: 63 MMHG | SYSTOLIC BLOOD PRESSURE: 133 MMHG

## 2021-03-18 VITALS — OXYGEN SATURATION: 93 %

## 2021-03-18 VITALS — SYSTOLIC BLOOD PRESSURE: 134 MMHG | DIASTOLIC BLOOD PRESSURE: 82 MMHG | OXYGEN SATURATION: 91 %

## 2021-03-18 VITALS — OXYGEN SATURATION: 95 %

## 2021-03-18 VITALS — OXYGEN SATURATION: 94 %

## 2021-03-18 VITALS — DIASTOLIC BLOOD PRESSURE: 68 MMHG | SYSTOLIC BLOOD PRESSURE: 128 MMHG

## 2021-03-18 VITALS — DIASTOLIC BLOOD PRESSURE: 92 MMHG | SYSTOLIC BLOOD PRESSURE: 142 MMHG | OXYGEN SATURATION: 92 %

## 2021-03-18 VITALS — OXYGEN SATURATION: 96 %

## 2021-03-18 VITALS — SYSTOLIC BLOOD PRESSURE: 131 MMHG | DIASTOLIC BLOOD PRESSURE: 67 MMHG

## 2021-03-18 VITALS — DIASTOLIC BLOOD PRESSURE: 66 MMHG | SYSTOLIC BLOOD PRESSURE: 135 MMHG

## 2021-03-18 VITALS — OXYGEN SATURATION: 91 %

## 2021-03-18 LAB
BASOPHILS # BLD AUTO: 0.1 10^3/UL (ref 0–0.2)
BASOPHILS NFR BLD AUTO: 0.6 % (ref 0–1)
BUN SERPL-MCNC: 14 MG/DL (ref 7–18)
CALCIUM SERPL-MCNC: 9 MG/DL (ref 8.5–10.1)
CHLORIDE SERPL-SCNC: 104 MEQ/L (ref 98–107)
CO2 SERPL-SCNC: 28 MEQ/L (ref 21–32)
CREAT SERPL-MCNC: 0.67 MG/DL (ref 0.7–1.3)
EOSINOPHIL # BLD AUTO: 0.6 10^3/UL (ref 0–0.5)
EOSINOPHIL NFR BLD AUTO: 6.4 % (ref 0–3)
GFR SERPL CREATININE-BSD FRML MDRD: > 60 ML/MIN/{1.73_M2} (ref 56–?)
GLUCOSE SERPL-MCNC: 193 MG/DL (ref 70–100)
HCT VFR BLD AUTO: 41.9 % (ref 42–52)
HGB BLD-MCNC: 13.7 G/DL (ref 13.5–17.5)
LYMPHOCYTES # BLD AUTO: 1.1 10^3/UL (ref 1.5–5)
LYMPHOCYTES NFR BLD AUTO: 12.3 % (ref 24–44)
MCH RBC QN AUTO: 30.1 PG (ref 27–33)
MCHC RBC AUTO-ENTMCNC: 32.7 G/DL (ref 32–36.5)
MCV RBC AUTO: 92.1 FL (ref 80–96)
MONOCYTES # BLD AUTO: 0.6 10^3/UL (ref 0–0.8)
MONOCYTES NFR BLD AUTO: 6.5 % (ref 2–8)
NEUTROPHILS # BLD AUTO: 6.7 10^3/UL (ref 1.5–8.5)
NEUTROPHILS NFR BLD AUTO: 73.6 % (ref 36–66)
PLATELET # BLD AUTO: 165 10^3/UL (ref 150–450)
POTASSIUM SERPL-SCNC: 3.6 MEQ/L (ref 3.5–5.1)
RBC # BLD AUTO: 4.55 10^6/UL (ref 4.3–6.1)
SODIUM SERPL-SCNC: 137 MEQ/L (ref 136–145)
WBC # BLD AUTO: 9.1 10^3/UL (ref 4–10)

## 2021-03-18 RX ADMIN — KETOROLAC TROMETHAMINE SCH MG: 30 INJECTION, SOLUTION INTRAMUSCULAR at 09:10

## 2021-03-18 RX ADMIN — Medication SCH MLS/HR: at 15:28

## 2021-03-18 RX ADMIN — SODIUM CHLORIDE SCH UNITS: 4.5 INJECTION, SOLUTION INTRAVENOUS at 21:18

## 2021-03-18 RX ADMIN — PANTOPRAZOLE SODIUM SCH MG: 40 TABLET, DELAYED RELEASE ORAL at 08:10

## 2021-03-18 RX ADMIN — KETOROLAC TROMETHAMINE SCH MG: 30 INJECTION, SOLUTION INTRAMUSCULAR at 15:52

## 2021-03-18 RX ADMIN — KETOROLAC TROMETHAMINE SCH MG: 30 INJECTION, SOLUTION INTRAMUSCULAR at 04:38

## 2021-03-18 RX ADMIN — LEVALBUTEROL HYDROCHLORIDE SCH MG: 1.25 SOLUTION, CONCENTRATE RESPIRATORY (INHALATION) at 13:18

## 2021-03-18 RX ADMIN — MAGNESIUM HYDROXIDE SCH ML: 400 SUSPENSION ORAL at 08:09

## 2021-03-18 RX ADMIN — KETOROLAC TROMETHAMINE SCH MG: 30 INJECTION, SOLUTION INTRAMUSCULAR at 21:18

## 2021-03-18 RX ADMIN — LEVALBUTEROL HYDROCHLORIDE SCH MG: 1.25 SOLUTION, CONCENTRATE RESPIRATORY (INHALATION) at 07:49

## 2021-03-18 RX ADMIN — MAGNESIUM HYDROXIDE SCH ML: 400 SUSPENSION ORAL at 08:16

## 2021-03-18 RX ADMIN — LEVALBUTEROL HYDROCHLORIDE SCH MG: 1.25 SOLUTION, CONCENTRATE RESPIRATORY (INHALATION) at 02:05

## 2021-03-18 RX ADMIN — DOCUSATE SODIUM SCH MG: 100 CAPSULE, LIQUID FILLED ORAL at 21:18

## 2021-03-18 RX ADMIN — SODIUM CHLORIDE SCH UNITS: 4.5 INJECTION, SOLUTION INTRAVENOUS at 08:10

## 2021-03-18 RX ADMIN — DOCUSATE SODIUM SCH MG: 100 CAPSULE, LIQUID FILLED ORAL at 08:10

## 2021-03-18 RX ADMIN — LEVALBUTEROL HYDROCHLORIDE SCH MG: 1.25 SOLUTION, CONCENTRATE RESPIRATORY (INHALATION) at 19:43

## 2021-03-18 NOTE — IPN
PROGRESS NOTE



DATE:  03/18/2021



This is now the third postoperative day for Mr. Bose. I had the intention

of pulling his chest tubes today; however, he was noted to put out 230 mL in

the morning shift, and I therefore rather than pull his chest tubes diuresed

him. He is positive over 2000 mL.



His pain is being well controlled using the epidural. 



His vital signs show a maximum temperature of 98.2 with a heart rate that

ranges between  in a sinus rhythm, respiratory rate of 19-22 without the

use of accessory muscles, who is 91%-96% saturated on room air, and whose blood

pressure is ranging between 142/90 to 128/68.



His intake and output for the past 24 hours has been recorded as 3740 in and

1360 out for a positivity of 2380 mL. He put out 110 mL from the chest tube

yesterday but 230 mL in the last 8  hours. Weight today is 164.2 kg compared to

164.5 kg.



PHYSICAL EXAMINATION: His lungs show equal breath sounds on either side with

coarse rhonchi, which clear with coughing. Percussion note is full to the

diaphragm. Cardiac exam is without murmurs, clicks, gallops, or rubs.  I cannot

feel his point of maximal impulse (PMI). S1 and S2 are normal. Abdomen is soft

and nontender. Bowel sounds are positive. There is no hepatomegaly. No

costovertebral angle (CVA) tenderness. He did have a bowel movement this

morning. Extremities show trace pretibial edema. No calf tenderness, no

differential swelling of the upper extremities. Skin is warm, dry, and perfused

without cyanosis or mottling, including that of the nailbeds and knees. Neck is

supple. There is no jugular venous distention. No subcutaneous emphysema.

Trachea is midline. Mouth shows the mucous membranes to be pink and moist. Lips

and commissures without lesions. No thrush. Eyes show his pupils to be equal

and reactive. Extraocular motion intact. Sclerae anicteric. Neurologic shows

II-XII intact. Normal gross motor, gross sensation intact. Gait is also intact.

Psychiatric shows him to be awake, alert, and oriented times three with

appropriate mood and affect and conversational.



His white count is 9.1 with a hemoglobin and hematocrit of 13.7 and 41.9,

respectively, unchanged from yesterday. Platelet count is 165 and stable.

Differential shows 73% neutrophils, 12% lymphocytes, and 6% monocytes. There

are no immature forms or toxic granulations.



Chemistries show normal electrolytes with BUN and creatinine of 14 and 0.67,

glucose of 193, and a calcium 9.0.



His chest x-ray today shows the lungs fully expanded to the chest wall. Chest

tubes are in good place. I see no posterior infiltrates. 



IMPRESSION: 

1. Postoperative day #3 status post wedge resection/thoracotomy of two colon

metastasis to the lung.

2. Obesity.

3. Chronic obstructive pulmonary disease (COPD).

4. Hyperlipidemia. 

5. Coronary artery disease, previously on Plavix.

6. Hypertension. 

7. Diabetes. 



PLAN AND DISCUSSION: As noted above, I will refrain from removing his chest

tube today. I will rather diurese him with Lasix. I will keep him off suction.

I am very gratified to his pain control response.

## 2021-03-18 NOTE — REP
INDICATION:

lung wedge resection left.



COMPARISON:

03/17/2021



TECHNIQUE:

Upright PA and lateral chest.



FINDINGS:

In the 2 left thoracotomy tubes are unchanged.

No pneumothorax is identified on the study today.

There is a subtle opacity in the left suprahilar area compatible with infiltrate.  The

right lung is clear.

Cardiac size is upper normal.

The idalmis, mediastinum, and skeletal structures are unremarkable.

There is a left subclavian Iqshdu-H-Qczm with the tip in the superior vena cava in

satisfactory location, unchanged.

There is an epidural catheter, unchanged.





IMPRESSION:

No pneumothorax is identified today.

The 2 left thoracotomy tubes are unchanged.

Subtle left suprahilar infiltrate.





<Electronically signed by Gustabo Choi > 03/18/21 [id number]

## 2021-03-19 VITALS — OXYGEN SATURATION: 93 %

## 2021-03-19 VITALS — OXYGEN SATURATION: 94 %

## 2021-03-19 VITALS — SYSTOLIC BLOOD PRESSURE: 136 MMHG | DIASTOLIC BLOOD PRESSURE: 70 MMHG

## 2021-03-19 VITALS — OXYGEN SATURATION: 95 %

## 2021-03-19 VITALS — SYSTOLIC BLOOD PRESSURE: 126 MMHG | DIASTOLIC BLOOD PRESSURE: 69 MMHG | OXYGEN SATURATION: 94 %

## 2021-03-19 VITALS — DIASTOLIC BLOOD PRESSURE: 77 MMHG | SYSTOLIC BLOOD PRESSURE: 131 MMHG | OXYGEN SATURATION: 96 %

## 2021-03-19 VITALS — DIASTOLIC BLOOD PRESSURE: 68 MMHG | SYSTOLIC BLOOD PRESSURE: 133 MMHG | OXYGEN SATURATION: 95 %

## 2021-03-19 VITALS — SYSTOLIC BLOOD PRESSURE: 124 MMHG | DIASTOLIC BLOOD PRESSURE: 72 MMHG

## 2021-03-19 VITALS — SYSTOLIC BLOOD PRESSURE: 134 MMHG | DIASTOLIC BLOOD PRESSURE: 73 MMHG

## 2021-03-19 LAB
BASOPHILS # BLD AUTO: 0 10^3/UL (ref 0–0.2)
BASOPHILS NFR BLD AUTO: 0.5 % (ref 0–1)
BUN SERPL-MCNC: 17 MG/DL (ref 7–18)
CALCIUM SERPL-MCNC: 8.3 MG/DL (ref 8.5–10.1)
CHLORIDE SERPL-SCNC: 104 MEQ/L (ref 98–107)
CO2 SERPL-SCNC: 29 MEQ/L (ref 21–32)
CREAT SERPL-MCNC: 0.7 MG/DL (ref 0.7–1.3)
EOSINOPHIL # BLD AUTO: 0.7 10^3/UL (ref 0–0.5)
EOSINOPHIL NFR BLD AUTO: 9.7 % (ref 0–3)
GFR SERPL CREATININE-BSD FRML MDRD: > 60 ML/MIN/{1.73_M2} (ref 56–?)
GLUCOSE SERPL-MCNC: 191 MG/DL (ref 70–100)
HCT VFR BLD AUTO: 40.4 % (ref 42–52)
HGB BLD-MCNC: 13.3 G/DL (ref 13.5–17.5)
LYMPHOCYTES # BLD AUTO: 1.2 10^3/UL (ref 1.5–5)
LYMPHOCYTES NFR BLD AUTO: 15.6 % (ref 24–44)
MCH RBC QN AUTO: 30.6 PG (ref 27–33)
MCHC RBC AUTO-ENTMCNC: 32.9 G/DL (ref 32–36.5)
MCV RBC AUTO: 92.9 FL (ref 80–96)
MONOCYTES # BLD AUTO: 0.5 10^3/UL (ref 0–0.8)
MONOCYTES NFR BLD AUTO: 6 % (ref 2–8)
NEUTROPHILS # BLD AUTO: 5.1 10^3/UL (ref 1.5–8.5)
NEUTROPHILS NFR BLD AUTO: 67.8 % (ref 36–66)
PLATELET # BLD AUTO: 189 10^3/UL (ref 150–450)
POTASSIUM SERPL-SCNC: 3.6 MEQ/L (ref 3.5–5.1)
RBC # BLD AUTO: 4.35 10^6/UL (ref 4.3–6.1)
SODIUM SERPL-SCNC: 139 MEQ/L (ref 136–145)
WBC # BLD AUTO: 7.5 10^3/UL (ref 4–10)

## 2021-03-19 RX ADMIN — KETOROLAC TROMETHAMINE SCH MG: 30 INJECTION, SOLUTION INTRAMUSCULAR at 09:07

## 2021-03-19 RX ADMIN — LEVALBUTEROL HYDROCHLORIDE SCH MG: 1.25 SOLUTION, CONCENTRATE RESPIRATORY (INHALATION) at 01:36

## 2021-03-19 RX ADMIN — LEVALBUTEROL HYDROCHLORIDE SCH MG: 1.25 SOLUTION, CONCENTRATE RESPIRATORY (INHALATION) at 19:36

## 2021-03-19 RX ADMIN — PANTOPRAZOLE SODIUM SCH MG: 40 TABLET, DELAYED RELEASE ORAL at 09:08

## 2021-03-19 RX ADMIN — SODIUM CHLORIDE SCH UNITS: 4.5 INJECTION, SOLUTION INTRAVENOUS at 21:00

## 2021-03-19 RX ADMIN — SODIUM CHLORIDE SCH UNITS: 4.5 INJECTION, SOLUTION INTRAVENOUS at 09:07

## 2021-03-19 RX ADMIN — DOCUSATE SODIUM SCH MG: 100 CAPSULE, LIQUID FILLED ORAL at 21:43

## 2021-03-19 RX ADMIN — KETOROLAC TROMETHAMINE SCH MG: 30 INJECTION, SOLUTION INTRAMUSCULAR at 15:56

## 2021-03-19 RX ADMIN — LEVALBUTEROL HYDROCHLORIDE SCH MG: 1.25 SOLUTION, CONCENTRATE RESPIRATORY (INHALATION) at 07:19

## 2021-03-19 RX ADMIN — LEVALBUTEROL HYDROCHLORIDE SCH MG: 1.25 SOLUTION, CONCENTRATE RESPIRATORY (INHALATION) at 13:49

## 2021-03-19 RX ADMIN — KETOROLAC TROMETHAMINE SCH MG: 30 INJECTION, SOLUTION INTRAMUSCULAR at 21:44

## 2021-03-19 RX ADMIN — KETOROLAC TROMETHAMINE SCH MG: 30 INJECTION, SOLUTION INTRAMUSCULAR at 04:18

## 2021-03-19 RX ADMIN — DOCUSATE SODIUM SCH MG: 100 CAPSULE, LIQUID FILLED ORAL at 09:08

## 2021-03-19 RX ADMIN — MAGNESIUM HYDROXIDE SCH ML: 400 SUSPENSION ORAL at 09:00

## 2021-03-19 RX ADMIN — Medication SCH MLS/HR: at 14:00

## 2021-03-19 NOTE — IPN
PROGRESS NOTE



DATE:  03/19/2021



This is now the 4th postoperative day for Mr. Bose. His pain is still

being well controlled with the epidural. 



His vital signs show a maximum temperature of 97.4 with a heart rate that

ranges between  in a sinus rhythm, respiratory rate of 20-22 without the

use of accessory muscles, who is 93%-96% saturated on room air and whose blood

pressure is ranging between 136/70 to 126/69.



His intake and output for the past 24 hours has been recorded as 780 in and

2320 out, for a negativity of 1540 mL. He has put out 310 mL in 24 hours up

till midnight; however, in the last 24 hours he has put out 110 mL. His weight

today is 160.2 kg compared to 164.2 kg yesterday.



PHYSICAL EXAMINATION: His lungs show equal breath sounds on either side with

coarse rhonchi, which clear with coughing. Percussion note is full to the

diaphragm. Cardiac exam is without murmurs, clicks, gallops, or rubs.  I cannot

feel his point of maximal impulse (PMI). S1 and S2 are normal. Abdomen is soft

and nontender. Bowel sounds are positive. There is no hepatomegaly. No

costovertebral angle (CVA) tenderness. Extremities show 1+ pretibial edema. No

calf tenderness, no differential swelling of the upper extremities. Skin is

warm, dry, and perfused without cyanosis or mottling, including that of the

nailbeds and knees. Neck is supple. There is no jugular venous distention. No

subcutaneous emphysema. Trachea is midline. Mouth shows the mucous membranes to

be pink and moist. Lips and commissures without lesions. No thrush. Eyes show

his pupils to be equal and reactive. Extraocular motion intact. Sclerae

anicteric. Neurologic shows II-XII intact. Normal gross motor, gross sensation

intact. Gait is not tested. Psychiatric shows him to be awake, alert, and

oriented times three with appropriate mood and affect and conversational.



His white count today is 7.5 with a hemoglobin and hematocrit of 13.3 and 40.4,

respectively. Platelet count is 189 and stable. Differential shows 67%

neutrophils, 15% lymphocytes, 6% monocytes. There are no immature forms or

toxic granulations.



His chemistries show normal electrolytes with a BUN and creatinine of 17 and

0.7 with a glucose of 191 and a calcium of 8.3. 



His chest x-ray today shows his lung fully expanded to the chest wall. Chest

tubes are in good place. There are no infiltrates. There is a little bit of

fluid in what looks to be the minor fissure on the right side. 



IMPRESSION: 

1. Postoperative day #4 status post wedge resection and thoracotomy of colon

metastasis to the lung.

2. Obesity.

3. Chronic obstructive pulmonary disease.

4. Hyperlipidemia. 

5. Coronary artery disease.

6. Hypertension.

7. Diabetes.



PLAN AND DISCUSSION: I will discontinue his chest tubes today. I will again

diurese him today. We will wean the epidural and discontinue the Enciso. If the

epidural wean goes well and the pain is controlled with oral medications, I

will discharge him tomorrow.

## 2021-03-19 NOTE — REP
INDICATION:

lung wedge resection left



COMPARISON:

03/18/2021



TECHNIQUE:

PA and lateral.



FINDINGS:

Two left-sided chest tubes are again identified.

There is a stable left apical pneumothorax measuring roughly 2.5 cm from the lung apex.



Subtle scattered bilateral airspace disease again noted and similar to prior

examination.  No definite effusion although layering pleural fluid cannot be excluded.

The mediastinum and cardiac silhouette are stable.



IMPRESSION:

1. Small stable left apical pneumothorax.

2. Subtle scattered bilateral airspace disease similar to prior examination.







<Electronically signed by Gregory Chua > 03/19/21 075

## 2021-03-20 VITALS — DIASTOLIC BLOOD PRESSURE: 87 MMHG | SYSTOLIC BLOOD PRESSURE: 154 MMHG

## 2021-03-20 VITALS — OXYGEN SATURATION: 97 % | DIASTOLIC BLOOD PRESSURE: 89 MMHG | SYSTOLIC BLOOD PRESSURE: 159 MMHG

## 2021-03-20 VITALS — DIASTOLIC BLOOD PRESSURE: 86 MMHG | OXYGEN SATURATION: 97 % | SYSTOLIC BLOOD PRESSURE: 152 MMHG

## 2021-03-20 LAB
BASOPHILS # BLD AUTO: 0 10^3/UL (ref 0–0.2)
BASOPHILS NFR BLD AUTO: 0.6 % (ref 0–1)
BUN SERPL-MCNC: 14 MG/DL (ref 7–18)
CALCIUM SERPL-MCNC: 8.5 MG/DL (ref 8.5–10.1)
CHLORIDE SERPL-SCNC: 103 MEQ/L (ref 98–107)
CO2 SERPL-SCNC: 28 MEQ/L (ref 21–32)
CREAT SERPL-MCNC: 0.65 MG/DL (ref 0.7–1.3)
EOSINOPHIL # BLD AUTO: 0.7 10^3/UL (ref 0–0.5)
EOSINOPHIL NFR BLD AUTO: 9.8 % (ref 0–3)
GFR SERPL CREATININE-BSD FRML MDRD: > 60 ML/MIN/{1.73_M2} (ref 56–?)
GLUCOSE SERPL-MCNC: 222 MG/DL (ref 70–100)
HCT VFR BLD AUTO: 40.4 % (ref 42–52)
HGB BLD-MCNC: 13.5 G/DL (ref 13.5–17.5)
LYMPHOCYTES # BLD AUTO: 0.9 10^3/UL (ref 1.5–5)
LYMPHOCYTES NFR BLD AUTO: 12.2 % (ref 24–44)
MCH RBC QN AUTO: 30.4 PG (ref 27–33)
MCHC RBC AUTO-ENTMCNC: 33.4 G/DL (ref 32–36.5)
MCV RBC AUTO: 91 FL (ref 80–96)
MONOCYTES # BLD AUTO: 0.5 10^3/UL (ref 0–0.8)
MONOCYTES NFR BLD AUTO: 7.1 % (ref 2–8)
NEUTROPHILS # BLD AUTO: 5 10^3/UL (ref 1.5–8.5)
NEUTROPHILS NFR BLD AUTO: 69.7 % (ref 36–66)
PLATELET # BLD AUTO: 205 10^3/UL (ref 150–450)
POTASSIUM SERPL-SCNC: 3.5 MEQ/L (ref 3.5–5.1)
RBC # BLD AUTO: 4.44 10^6/UL (ref 4.3–6.1)
SODIUM SERPL-SCNC: 139 MEQ/L (ref 136–145)
WBC # BLD AUTO: 7.1 10^3/UL (ref 4–10)

## 2021-03-20 RX ADMIN — LEVALBUTEROL HYDROCHLORIDE SCH MG: 1.25 SOLUTION, CONCENTRATE RESPIRATORY (INHALATION) at 02:14

## 2021-03-20 RX ADMIN — LEVALBUTEROL HYDROCHLORIDE SCH MG: 1.25 SOLUTION, CONCENTRATE RESPIRATORY (INHALATION) at 07:12

## 2021-03-20 RX ADMIN — KETOROLAC TROMETHAMINE SCH MG: 30 INJECTION, SOLUTION INTRAMUSCULAR at 09:20

## 2021-03-20 RX ADMIN — DOCUSATE SODIUM SCH MG: 100 CAPSULE, LIQUID FILLED ORAL at 09:19

## 2021-03-20 RX ADMIN — PANTOPRAZOLE SODIUM SCH MG: 40 TABLET, DELAYED RELEASE ORAL at 09:19

## 2021-03-20 RX ADMIN — SODIUM CHLORIDE SCH UNITS: 4.5 INJECTION, SOLUTION INTRAVENOUS at 09:19

## 2021-03-20 RX ADMIN — MAGNESIUM HYDROXIDE SCH ML: 400 SUSPENSION ORAL at 09:00

## 2021-03-20 RX ADMIN — KETOROLAC TROMETHAMINE SCH MG: 30 INJECTION, SOLUTION INTRAMUSCULAR at 06:11

## 2021-03-20 NOTE — REP
INDICATION:

lung wedge resection left.



COMPARISON:

03/19/2021



TECHNIQUE:

Upright PA and lateral chest.



FINDINGS:

The 2 left thoracotomy tubes have been removed.

There is a left apical pneumothorax measuring 1.6 cm, not significantly changed.

I suspect there is a small left pleural effusion versus atelectasis.

Right lung is clear.  Cardiac size is normal.

The left IJ Xlhndo-N-Ywcn is unchanged with the tip in the right atrium.





IMPRESSION:

The 2 left thoracotomy tubes have been removed.  There is a persisting 1.6 cm left

apical pneumothorax.

Probable small left pleural effusion.





<Electronically signed by Gustabo Choi > 03/20/21 [id number]

## 2021-03-20 NOTE — DSES
DISCHARGE SUMMARY



DATE OF ADMISSION:  03/15/2021

DATE OF DISCHARGE:  03/20/2021



DISCHARGE DIAGNOSES:

1.  Metastatic colon mass to the left lung.

2.  Obesity.

3.  Chronic obstructive pulmonary disease (COPD). 

4.  Hyperlipidemia.

5.  Coronary artery disease. 

6.  Hypertension.

7.  Diabetes. 

8.  Postoperative day #5 status post wedge resection and thoracotomy of the

    left upper lobe. 



HOSPITAL COURSE: The patient is a 55-year-old white male who in January 2019,

underwent a transverse colectomy for what turned out to be pathological stage

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 He was followed with surveillance

and a CT scan revealed a left upper lobe lesion in March 2020, which was 5 mm

and grew over the course of surveillance to 2.5 cm. He was essentially

asymptomatic and would not have known that there was something brewing in his

chest. He does not complain of shortness of breath, cough, sputum production,

fever, chills, sweats, or chest pain. There is no hemoptysis and certainly no

weight loss. He is not a current smoker. He smoked up to three months when he

was 15 and then quit. 



His preoperative FEV1 was 3.28, which is 85% of predicted with a DLCO of 38.95,

which is 130% of predicted. The PET scan showed minimal hypermetabolic uptake

with an SUV of 2.16. 



His was therefore brought to the operating room with a differential diagnosis

of primary lung cancer versus metastatic colon disease. The mass was rather

central in the left upper lobe and I could not see it with a thoracoscope and

therefore, a thoracotomy was undertaken. A generous wedge resection was done

with free margins. Pathologically it was metastatic colon carcinoma. 



He had a gratifyingly benign postoperative course. He had minimal pain with the

epidural and even after weaning the epidural he has minimal pain. His chest

x-ray showed his lung fully expanded to the chest wall. The epidural was weaned

on the fourth postoperative day and his chest tubes were removed. 



DISCHARGE MEDICATIONS: He is being discharged today on his home medications,

which include: 

1.  ProAir HFA two puffs q.i.d. p.r.n. shortness of breath.

2.  Vitamin C 500 mg q. day.

3.  Aspirin 81 mg q. day. 

4.  Atorvastatin 40 mg q. day. 

5.  Plavix 75 mg q. day. 

6.  Farxiga 10 mg q. a.m. 

7.  Allegra 180 mg q. day. 

8.  Losartan 50 mg q. day.

9.  Metformin 1000 mg b.i.d.

10.  Metoprolol 50 mg b.i.d. 

11.  Multivitamins one q. day.

12.  Ozempic 1 mg subcutaneously q. week.



DISCHARGE FOLLOW-UP: He will return to see me in one week with a chest x-ray

and postoperative follow-up. 



LABORATORY DATA: His discharge hemoglobin and hematocrit are 13.5 and 40.4 with

a white count of 7.1 and a platelet count of 205,000. Discharge chemistries

show normal electrolytes with a potassium of 3.5, BUN and creatinine of 14 and

0.65, and glucose of 222. 



IMAGING DATA: His chest x-ray shows his lung fully expands to the chest wall.

Costophrenic angles are sharp. There is a small apical cap of air on the left

side.

## 2021-03-29 ENCOUNTER — HOSPITAL ENCOUNTER (OUTPATIENT)
Dept: HOSPITAL 53 - M PLAIMG | Age: 56
End: 2021-03-29
Attending: THORACIC SURGERY (CARDIOTHORACIC VASCULAR SURGERY)
Payer: COMMERCIAL

## 2021-03-29 DIAGNOSIS — R91.1: Primary | ICD-10-CM

## 2021-03-29 NOTE — REPPI
INDICATION:

R91.1 SOLITARY PULMONARY NODULE



COMPARISON:

03/20/2021



TECHNIQUE:

PA and lateral.



FINDINGS:

Mediastinum and cardiac silhouette are stable.  Kkptei-H-[id number] again identified with

tip in the SVC.  Postsurgical pleuroparenchymal changes involving the left hemithorax

remains stable.  Right hemithorax is clear.  No new acute process identified.



IMPRESSION:

Left-sided postsurgical pleuroparenchymal changes similar to prior examination.

No new acute process appreciated.





<Electronically signed by Gregory Chua > 03/29/21 0912

## 2021-04-19 ENCOUNTER — HOSPITAL ENCOUNTER (OUTPATIENT)
Dept: HOSPITAL 53 - M PLAIMG | Age: 56
End: 2021-04-19
Attending: THORACIC SURGERY (CARDIOTHORACIC VASCULAR SURGERY)
Payer: COMMERCIAL

## 2021-04-19 DIAGNOSIS — Z48.813: ICD-10-CM

## 2021-04-19 DIAGNOSIS — C78.02: Primary | ICD-10-CM

## 2021-04-19 NOTE — REPPI
INDICATION:

C78.02 SECONDARY MALIGNANT NEOPLASM OF LEFT LUNG Z48.813



COMPARISON:

03/29/2021



TECHNIQUE:

PA and lateral.



FINDINGS:

The mediastinum and cardiac silhouette are normal.  [id number] identified with tip

in the SVC.  The lung fields are clear and without acute consolidation, effusion, or

pneumothorax.  The skeletal structures are intact and normal.



IMPRESSION:

No acute cardiopulmonary process.





<Electronically signed by Gregory Chua > 04/19/21 0808

## 2021-08-27 ENCOUNTER — HOSPITAL ENCOUNTER (OUTPATIENT)
Dept: HOSPITAL 53 - M RAD | Age: 56
End: 2021-08-27
Attending: REGISTERED NURSE
Payer: COMMERCIAL

## 2021-08-27 DIAGNOSIS — M50.322: ICD-10-CM

## 2021-08-27 DIAGNOSIS — M50.323: ICD-10-CM

## 2021-08-27 DIAGNOSIS — M47.814: Primary | ICD-10-CM

## 2021-08-27 DIAGNOSIS — M50.321: ICD-10-CM

## 2021-08-27 NOTE — REP
INDICATION:

RADICULOPATHY, SITE UNSPECIFIED



COMPARISON:

None.



TECHNIQUE:

AP, lateral, and swimmers views.



FINDINGS:

Alignment and kyphosis maintained.  No acute fracture/compression injury or

subluxation.  Moderate multilevel degenerative changes include endplate sclerosis,

minimal disc space narrowing, and moderate bridging osteophytes.  Paravertebral soft

tissues are grossly normal.



IMPRESSION:

Moderate multilevel degenerative spondylosis.





<Electronically signed by Gregory Chua > 08/27/21 [id number]

## 2021-08-27 NOTE — REP
INDICATION:

RADICULOPATHY, SITE UNSPECIFIED.



COMPARISON:

None.



TECHNIQUE:

AP, lateral, flexion/extension, bilateral oblique, swimmer's and open mouth views of

the cervical spine.



FINDINGS:

Alignment is maintained.  There is no evidence for acute fracture/compression injury

or subluxation.  Moderate multilevel degenerative changes include endplate sclerosis,

osteophytosis and disc space narrowing primarily involving C4-5 through C6-7.  Neural

foramen appear patent bilaterally.  Open mouth view demonstrates normal C1-C2

articulation and odontoid process.



IMPRESSION:

Moderate multifocal degenerative changes primarily involving C4 through C7.





<Electronically signed by Gregory Chua > 08/27/21 [id number]

## 2021-08-27 NOTE — REP
INDICATION:

RADICULOPATHY, SITE UNSPECIFIED



COMPARISON:

None.



TECHNIQUE:

AP, lateral, bilateral oblique, and coned-down views of the lumbar spine.



FINDINGS:

Alignment and lordosis maintained.  Vertebral bodies are intact.  No acute

fracture/compression injury or subluxation.  No evidence for spondylolysis or

spondylolisthesis.  Relatively mild endplate sclerosis, marginal spurring and facet

hypertrophy primarily involving L4-5 and L5-S1 along with mild L5-S1 disc space

narrowing.



IMPRESSION:

Mild early moderate degenerative changes primarily involving L5-S1 and L4-5.





<Electronically signed by Gregory Chua > 08/27/21 0874

## 2021-11-08 ENCOUNTER — HOSPITAL ENCOUNTER (OUTPATIENT)
Dept: HOSPITAL 53 - M RAD | Age: 56
End: 2021-11-08
Attending: INTERNAL MEDICINE
Payer: COMMERCIAL

## 2021-11-08 DIAGNOSIS — C18.9: Primary | ICD-10-CM

## 2021-11-08 PROCEDURE — 74177 CT ABD & PELVIS W/CONTRAST: CPT

## 2021-11-08 PROCEDURE — 71260 CT THORAX DX C+: CPT

## 2021-11-08 NOTE — REP
INDICATION:

MET COLON CA.



COMPARISON:

CT 03/06/2020, 01/28/2019.



TECHNIQUE:

Oral Gastrografin mixture 10 mL in 290 mL flavum water for 2 doses per our bowel

contrast protocol followed by scanning through the abdomen and pelvis.  Coronal and

sagittal reconstructions provided.



FINDINGS:

CT abdomen: Lung bases were discussed in the CT chest report this date.  There is no

hiatal hernia.  Stomach filled with the some retained food and oral contrast and

unremarkable.  See no hepatomegaly, splenomegaly, hepatic or splenic mass,

intrahepatic biliary dilatation or adjacent ascites.  There is a splenule adjacent to

the medial margin of the spleen just be low the splenic hilum.  Gallbladder partially

contracted without calcified stone or mass.  Adrenal glands are normal.  The

visualized pancreas was unremarkable.  The aorta has atherosclerotic calcifications

without aneurysm or dissection.  No periaortic, other retroperitoneal or mesenteric

pathologic sized lymphadenopathy.  Small bowel loops show oral contrast without wall

thickening, mesenteric inflammatory changes or mass.  No dilatation.  Stool and gas

are seen throughout the colon.  No sign of colitis, diverticulitis stricture or mass

in the abdomen proper.  Kidneys show perinephric stranding similar to the previous

study no hydronephrosis, solid mass or stone.  There is an upper pole cyst on the

right unchanged in less than a cm.  Anastomotic suture line in the mid abdomen as

before from partial colectomy with no evidence of wall thickening, inflammatory change

in the pericolonic fat or mass.  No mesenteric or other retroperitoneal adenopathy.

Bone windows show marginal osteophytes more in the thoracic and lumbar region but no

destructive lesion or compression deformities.  The visualized lower ribs were intact.



CT pelvis: The sacrum, pelvis, hips and SI joints show some minor degenerative changes

but no destructive lesion or fracture.  Colonic segments in the pelvis were

unremarkable bladder is partially filled with the contrast and urine without the

dilated ureters or stone visible.  No wall thickening or mass involving the bladder.

No pelvic lymphadenopathy or free fluid.  Small bowel loops in the pelvis were

unremarkable.  Appendix was not visualized but there are no inflammatory changes in

the right lower quadrant.  No ventral or inguinal bowel herniation, the right inguinal

canal mildly distended with omental fat.  Abdominal wall shows some diastasis without

the ventral hernia.  This is unchanged.  No inguinal adenopathy.



IMPRESSION:

1. No evidence of recurrence or metastatic disease from known colon carcinoma.

Postoperative changes are seen from colon resection and reanastomosis.  No new or

acute finding.  No metastatic disease in the liver, spleen, kidneys adrenal glands or

mesentery.  No ascites.  Stable exam.





<Electronically signed by Bill Ruggiero > 11/08/21 0052

## 2021-11-08 NOTE — REP
INDICATION:

MET COLON CA.



COMPARISON:

CT 02/25/2021, 11/30/2020, 09/02/2020, 06/08/2020.



TECHNIQUE:

Bolus 100 mL Isovue 370 scanning through the chest with coronal and sagittal

reconstructions.



FINDINGS:

Since the previous CT patient has undergone a left upper lobe wedge resection.  At 2.6

x 2.4 cm lung mass in the left upper lobe no longer present some curvilinear fibro

atelectatic change in the perihilar suprahilar region on the left side.  Six tends

towards the periphery.  Remainder of the left upper lobe and lower lobe were

unremarkable.  The right lung shows minor dependent atelectatic change posteriorly but

no nodule, mass, pleural effusion or acute infiltrate heart is not enlarged.  There is

no pericardial thickening or effusion.  There coronary artery calcifications.  A left

subclavian port catheter is seen terminating in the SVC just above the right atrium.

There is no pathologic sized mediastinal, hilar, axillary or supraclavicular

adenopathy.  Bone windows show degenerative changes at endplates throughout the

thoracic spine.  Sternum, manubrium, medial clavicles, visualized portions of humeral

heads and scapulae as well as the ribs were all without acute finding.



IMPRESSION:

1. Status post lung wedge resection left upper lobe of the nearly 3 cm mass seen on

previous CT.  This mass is gone and there is some postoperative curvilinear scarring

in the posterior and suprahilar regions with some stranding towards the hilum but no

evidence of recurrence or new mass in this region.  Right lung was clear.

2. No other significant or acute finding in the chest.





<Electronically signed by Bill Ruggiero > 11/08/21 [id number]

## 2022-02-07 ENCOUNTER — HOSPITAL ENCOUNTER (OUTPATIENT)
Dept: HOSPITAL 53 - M LAB | Age: 57
End: 2022-02-07
Attending: FAMILY MEDICINE
Payer: COMMERCIAL

## 2022-02-07 DIAGNOSIS — D51.8: ICD-10-CM

## 2022-02-07 DIAGNOSIS — G62.0: ICD-10-CM

## 2022-02-07 DIAGNOSIS — E78.2: ICD-10-CM

## 2022-02-07 DIAGNOSIS — I10: ICD-10-CM

## 2022-02-07 DIAGNOSIS — D50.8: ICD-10-CM

## 2022-02-07 DIAGNOSIS — Z12.5: Primary | ICD-10-CM

## 2022-02-07 DIAGNOSIS — D11.9: ICD-10-CM

## 2022-02-07 LAB
CHOLEST SERPL-MCNC: 170 MG/DL (ref ?–200)
CHOLEST/HDLC SERPL: 3.4 {RATIO} (ref ?–5)
CREAT UR-MCNC: 121 MG/DL
FERRITIN SERPL-MCNC: 225 NG/ML (ref 26–388)
FOLATE SERPL-MCNC: 10.5 NG/ML
HDLC SERPL-MCNC: 50 MG/DL (ref 40–?)
IRON SATN MFR SERPL: 20.1 % (ref 19.7–50)
IRON SERPL-MCNC: 60 UG/DL (ref 65–175)
LDLC SERPL CALC-MCNC: 89 MG/DL (ref ?–100)
MICROALBUMIN UR-MCNC: 51.8 MG/L
MICROALBUMIN/CREAT UR: 42.8 MCG/MG (ref 0–30)
NONHDLC SERPL-MCNC: 120 MG/DL
PSA SERPL-MCNC: 0.44 NG/ML (ref ?–4)
T3FREE SERPL-MCNC: 2.3 PG/ML (ref 2.2–4)
T4 FREE SERPL-MCNC: 0.8 NG/DL (ref 0.76–1.46)
TIBC SERPL-MCNC: 299 UG/DL (ref 250–450)
TRIGL SERPL-MCNC: 154 MG/DL (ref ?–150)
TSH SERPL DL<=0.005 MIU/L-ACNC: 1.16 UIU/ML (ref 0.36–3.74)
URATE SERPL-MCNC: 3.9 MG/DL (ref 3.5–7.2)
VIT B12 SERPL-MCNC: 289 PG/ML

## 2022-03-10 NOTE — REP
INDICATION:

PAIN IN JOINTS OF LEFT HAND.  Left 1st metacarpal pain.



COMPARISON:

Comparison left finger images 04/18/2015..



TECHNIQUE:

Four views of the left hand are obtained.



FINDINGS:

Four views of the left hand demonstrate 2 tiny metallic densities in the thenar soft

tissues representing metallic foreign bodies.  One of these appears to have been

visible on the 04/18/2015 prior finger radiographs although it as a finger series,

field of view is limited..  Overall mineralization pattern is normal.  No fracture or

subluxation is seen.  There is mild osteoarthritis at the 1st carpometacarpal joint

with narrowing and early spur formation.  A small subcortical cyst is seen in the

distal end of the 3rd metacarpal and mild osteoarthritic changes are noted at the DIP

joints of the index long and small fingers.  No bony erosive or destructive lesion is

seen..  .





IMPRESSION:

Two tiny metallic foreign bodies in the thenar soft tissues.  Osteoarthritic changes

as noted above.  No acute abnormality..







<Electronically signed by John Patel > 02/14/21 0972
104.3

## 2022-06-23 ENCOUNTER — HOSPITAL ENCOUNTER (OUTPATIENT)
Dept: HOSPITAL 53 - M RAD | Age: 57
End: 2022-06-23
Payer: COMMERCIAL

## 2022-06-23 DIAGNOSIS — C18.4: Primary | ICD-10-CM

## 2022-06-23 DIAGNOSIS — K76.0: ICD-10-CM

## 2022-06-23 PROCEDURE — 71260 CT THORAX DX C+: CPT

## 2022-07-29 ENCOUNTER — HOSPITAL ENCOUNTER (OUTPATIENT)
Dept: HOSPITAL 53 - M PLARAD | Age: 57
End: 2022-07-29
Payer: COMMERCIAL

## 2022-07-29 DIAGNOSIS — M70.72: ICD-10-CM

## 2022-07-29 DIAGNOSIS — C18.4: Primary | ICD-10-CM

## 2022-07-29 DIAGNOSIS — K44.9: ICD-10-CM

## 2022-07-29 DIAGNOSIS — C18.9: ICD-10-CM

## 2022-08-16 ENCOUNTER — HOSPITAL ENCOUNTER (OUTPATIENT)
Dept: HOSPITAL 53 - M RAD | Age: 57
End: 2022-08-16
Payer: COMMERCIAL

## 2022-08-16 DIAGNOSIS — C18.0: ICD-10-CM

## 2022-08-16 DIAGNOSIS — C18.4: Primary | ICD-10-CM

## 2022-08-16 PROCEDURE — 74183 MRI ABD W/O CNTR FLWD CNTR: CPT

## 2023-01-26 ENCOUNTER — HOSPITAL ENCOUNTER (OUTPATIENT)
Dept: HOSPITAL 53 - M RAD | Age: 58
End: 2023-01-26
Attending: INTERNAL MEDICINE
Payer: COMMERCIAL

## 2023-01-26 DIAGNOSIS — M50.30: ICD-10-CM

## 2023-01-26 DIAGNOSIS — D38.1: Primary | ICD-10-CM

## 2023-01-26 DIAGNOSIS — Z90.49: ICD-10-CM

## 2023-01-26 DIAGNOSIS — R16.1: ICD-10-CM

## 2023-01-26 DIAGNOSIS — K43.9: ICD-10-CM

## 2023-01-26 DIAGNOSIS — C18.4: ICD-10-CM

## 2023-02-09 ENCOUNTER — HOSPITAL ENCOUNTER (OUTPATIENT)
Dept: HOSPITAL 53 - M LABSMTC | Age: 58
End: 2023-02-09
Attending: ANESTHESIOLOGY
Payer: COMMERCIAL

## 2023-02-09 DIAGNOSIS — Z20.822: ICD-10-CM

## 2023-02-09 DIAGNOSIS — Z01.812: Primary | ICD-10-CM

## 2023-02-14 ENCOUNTER — HOSPITAL ENCOUNTER (OUTPATIENT)
Dept: HOSPITAL 53 - M SDC | Age: 58
Discharge: HOME | End: 2023-02-14
Attending: STUDENT IN AN ORGANIZED HEALTH CARE EDUCATION/TRAINING PROGRAM
Payer: COMMERCIAL

## 2023-02-14 VITALS — SYSTOLIC BLOOD PRESSURE: 121 MMHG | DIASTOLIC BLOOD PRESSURE: 70 MMHG

## 2023-02-14 VITALS — HEIGHT: 73 IN | BODY MASS INDEX: 41.75 KG/M2 | WEIGHT: 315 LBS

## 2023-02-14 DIAGNOSIS — R73.03: ICD-10-CM

## 2023-02-14 DIAGNOSIS — C18.9: ICD-10-CM

## 2023-02-14 DIAGNOSIS — M19.90: ICD-10-CM

## 2023-02-14 DIAGNOSIS — Z88.8: ICD-10-CM

## 2023-02-14 DIAGNOSIS — Z95.5: ICD-10-CM

## 2023-02-14 DIAGNOSIS — Z79.02: ICD-10-CM

## 2023-02-14 DIAGNOSIS — C78.02: Primary | ICD-10-CM

## 2023-02-14 DIAGNOSIS — I10: ICD-10-CM

## 2023-02-14 DIAGNOSIS — J45.909: ICD-10-CM

## 2023-02-14 DIAGNOSIS — Z79.84: ICD-10-CM

## 2023-02-14 DIAGNOSIS — Z79.899: ICD-10-CM

## 2023-02-14 DIAGNOSIS — Z79.82: ICD-10-CM

## 2023-02-14 DIAGNOSIS — G47.33: ICD-10-CM

## 2023-02-14 DIAGNOSIS — E78.00: ICD-10-CM

## 2023-02-14 DIAGNOSIS — Z92.21: ICD-10-CM

## 2023-02-14 PROCEDURE — 31627 NAVIGATIONAL BRONCHOSCOPY: CPT

## 2023-02-14 PROCEDURE — 31624 DX BRONCHOSCOPE/LAVAGE: CPT

## 2023-02-14 PROCEDURE — 71045 X-RAY EXAM CHEST 1 VIEW: CPT

## 2023-02-14 PROCEDURE — 88173 CYTOPATH EVAL FNA REPORT: CPT

## 2023-02-14 PROCEDURE — 31628 BRONCHOSCOPY/LUNG BX EACH: CPT

## 2023-02-14 PROCEDURE — 88305 TISSUE EXAM BY PATHOLOGIST: CPT

## 2023-02-14 PROCEDURE — 88108 CYTOPATH CONCENTRATE TECH: CPT

## 2023-02-14 PROCEDURE — 31654 BRONCH EBUS IVNTJ PERPH LES: CPT

## 2023-02-14 PROCEDURE — 88313 SPECIAL STAINS GROUP 2: CPT

## 2023-02-14 PROCEDURE — 31629 BRONCHOSCOPY/NEEDLE BX EACH: CPT

## 2023-02-14 PROCEDURE — 76000 FLUOROSCOPY <1 HR PHYS/QHP: CPT

## 2023-02-14 PROCEDURE — 31626 BRONCHOSCOPY W/MARKERS: CPT

## 2023-05-08 ENCOUNTER — HOSPITAL ENCOUNTER (OUTPATIENT)
Dept: HOSPITAL 53 - M RAD | Age: 58
End: 2023-05-08
Attending: INTERNAL MEDICINE
Payer: COMMERCIAL

## 2023-05-08 DIAGNOSIS — C18.9: Primary | ICD-10-CM

## 2023-05-08 DIAGNOSIS — C78.00: ICD-10-CM

## 2023-05-08 PROCEDURE — 71260 CT THORAX DX C+: CPT

## 2023-05-08 PROCEDURE — 74177 CT ABD & PELVIS W/CONTRAST: CPT

## 2023-08-09 ENCOUNTER — HOSPITAL ENCOUNTER (OUTPATIENT)
Dept: HOSPITAL 53 - M LAB REF | Age: 58
End: 2023-08-09
Attending: REGISTERED NURSE
Payer: COMMERCIAL

## 2023-08-09 DIAGNOSIS — E66.9: Primary | ICD-10-CM

## 2023-08-09 DIAGNOSIS — E11.9: ICD-10-CM

## 2023-08-09 DIAGNOSIS — Z12.5: ICD-10-CM

## 2023-08-09 LAB
APPEARANCE UR: CLEAR
BACTERIA UR QL AUTO: NEGATIVE
BILIRUB UR QL STRIP.AUTO: NEGATIVE
CHOLEST SERPL-MCNC: 239 MG/DL (ref ?–200)
CHOLEST/HDLC SERPL: 4.31 {RATIO} (ref ?–5)
CREAT UR-MCNC: 39.9 MG/DL
EST. AVERAGE GLUCOSE BLD GHB EST-MCNC: 240 MG/DL (ref 60–110)
GLUCOSE UR QL STRIP.AUTO: (no result) MG/DL
HDLC SERPL-MCNC: 55.4 MG/DL (ref 40–?)
HGB UR QL STRIP.AUTO: NEGATIVE
KETONES UR QL STRIP.AUTO: (no result) MG/DL
LDLC SERPL CALC-MCNC: 119.4 MG/DL (ref ?–100)
LEUKOCYTE ESTERASE UR QL STRIP.AUTO: NEGATIVE
MICROALBUMIN UR-MCNC: 8 MG/L
MICROALBUMIN/CREAT UR: 20 MCG/MG (ref 0–30)
NITRITE UR QL STRIP.AUTO: NEGATIVE
NONHDLC SERPL-MCNC: 183.6 MG/DL
PH UR STRIP.AUTO: 5 UNITS (ref 5–9)
PROT UR QL STRIP.AUTO: NEGATIVE MG/DL
PSA SERPL-MCNC: 0.39 NG/ML (ref ?–4)
RBC # UR AUTO: 0 /HPF (ref 0–3)
SP GR UR STRIP.AUTO: 1.03 (ref 1–1.03)
SQUAMOUS #/AREA URNS AUTO: 0 /HPF (ref 0–6)
T3FREE SERPL-MCNC: 3.2 PG/ML (ref 2.3–4.2)
T4 FREE SERPL-MCNC: 1 NG/DL (ref 0.89–1.76)
TRIGL SERPL-MCNC: 321 MG/DL (ref ?–150)
TSH SERPL DL<=0.005 MIU/L-ACNC: 1.83 UIU/ML (ref 0.55–4.78)
UROBILINOGEN UR QL STRIP.AUTO: 0.2 MG/DL (ref 0–2)
VIT B12 SERPL-MCNC: 425 PG/ML (ref 211–911)
WBC #/AREA URNS AUTO: 0 /HPF (ref 0–3)

## 2023-08-23 ENCOUNTER — HOSPITAL ENCOUNTER (OUTPATIENT)
Dept: HOSPITAL 53 - M RAD | Age: 58
End: 2023-08-23
Payer: COMMERCIAL

## 2023-08-23 DIAGNOSIS — C18.9: Primary | ICD-10-CM

## 2023-08-23 PROCEDURE — 71260 CT THORAX DX C+: CPT

## 2023-08-23 PROCEDURE — 74177 CT ABD & PELVIS W/CONTRAST: CPT

## 2023-11-22 ENCOUNTER — HOSPITAL ENCOUNTER (OUTPATIENT)
Dept: HOSPITAL 53 - M RAD | Age: 58
End: 2023-11-22
Attending: INTERNAL MEDICINE
Payer: COMMERCIAL

## 2023-11-22 DIAGNOSIS — C18.9: Primary | ICD-10-CM

## 2023-11-22 DIAGNOSIS — R91.1: ICD-10-CM

## 2023-11-22 PROCEDURE — 74177 CT ABD & PELVIS W/CONTRAST: CPT

## 2023-11-22 PROCEDURE — 71260 CT THORAX DX C+: CPT

## 2024-02-18 ENCOUNTER — HOSPITAL ENCOUNTER (INPATIENT)
Dept: HOSPITAL 53 - M ED | Age: 59
LOS: 23 days | Discharge: HOME | DRG: 854 | End: 2024-03-12
Attending: INTERNAL MEDICINE | Admitting: STUDENT IN AN ORGANIZED HEALTH CARE EDUCATION/TRAINING PROGRAM
Payer: COMMERCIAL

## 2024-02-18 VITALS — HEIGHT: 74 IN | WEIGHT: 306.44 LBS | BODY MASS INDEX: 39.33 KG/M2

## 2024-02-18 VITALS — DIASTOLIC BLOOD PRESSURE: 57 MMHG | OXYGEN SATURATION: 96 % | SYSTOLIC BLOOD PRESSURE: 106 MMHG | TEMPERATURE: 99.3 F

## 2024-02-18 VITALS — OXYGEN SATURATION: 99 % | DIASTOLIC BLOOD PRESSURE: 74 MMHG | SYSTOLIC BLOOD PRESSURE: 138 MMHG

## 2024-02-18 VITALS — DIASTOLIC BLOOD PRESSURE: 62 MMHG | OXYGEN SATURATION: 98 % | SYSTOLIC BLOOD PRESSURE: 127 MMHG

## 2024-02-18 VITALS — SYSTOLIC BLOOD PRESSURE: 137 MMHG | OXYGEN SATURATION: 97 % | DIASTOLIC BLOOD PRESSURE: 73 MMHG

## 2024-02-18 VITALS — DIASTOLIC BLOOD PRESSURE: 63 MMHG | OXYGEN SATURATION: 94 % | SYSTOLIC BLOOD PRESSURE: 125 MMHG | TEMPERATURE: 99 F

## 2024-02-18 VITALS — DIASTOLIC BLOOD PRESSURE: 70 MMHG | SYSTOLIC BLOOD PRESSURE: 130 MMHG | OXYGEN SATURATION: 99 %

## 2024-02-18 DIAGNOSIS — Z79.899: ICD-10-CM

## 2024-02-18 DIAGNOSIS — Z79.4: ICD-10-CM

## 2024-02-18 DIAGNOSIS — R94.31: ICD-10-CM

## 2024-02-18 DIAGNOSIS — R55: ICD-10-CM

## 2024-02-18 DIAGNOSIS — K44.9: ICD-10-CM

## 2024-02-18 DIAGNOSIS — E87.1: ICD-10-CM

## 2024-02-18 DIAGNOSIS — Z79.82: ICD-10-CM

## 2024-02-18 DIAGNOSIS — C18.4: ICD-10-CM

## 2024-02-18 DIAGNOSIS — Z90.49: ICD-10-CM

## 2024-02-18 DIAGNOSIS — R21: ICD-10-CM

## 2024-02-18 DIAGNOSIS — F41.9: ICD-10-CM

## 2024-02-18 DIAGNOSIS — D62: ICD-10-CM

## 2024-02-18 DIAGNOSIS — N49.3: ICD-10-CM

## 2024-02-18 DIAGNOSIS — K92.1: ICD-10-CM

## 2024-02-18 DIAGNOSIS — E66.01: ICD-10-CM

## 2024-02-18 DIAGNOSIS — E87.5: ICD-10-CM

## 2024-02-18 DIAGNOSIS — R65.20: ICD-10-CM

## 2024-02-18 DIAGNOSIS — I10: ICD-10-CM

## 2024-02-18 DIAGNOSIS — A41.9: Primary | ICD-10-CM

## 2024-02-18 DIAGNOSIS — Z79.69: ICD-10-CM

## 2024-02-18 DIAGNOSIS — Z90.2: ICD-10-CM

## 2024-02-18 DIAGNOSIS — E83.42: ICD-10-CM

## 2024-02-18 DIAGNOSIS — Z88.8: ICD-10-CM

## 2024-02-18 DIAGNOSIS — E11.65: ICD-10-CM

## 2024-02-18 DIAGNOSIS — K26.9: ICD-10-CM

## 2024-02-18 DIAGNOSIS — I25.10: ICD-10-CM

## 2024-02-18 DIAGNOSIS — F32.A: ICD-10-CM

## 2024-02-18 DIAGNOSIS — E87.20: ICD-10-CM

## 2024-02-18 LAB
ALBUMIN SERPL BCG-MCNC: 2.9 G/DL (ref 3.2–5.2)
ALP SERPL-CCNC: 123 U/L (ref 46–116)
ALT SERPL W P-5'-P-CCNC: 40 U/L (ref 7–40)
AMYLASE SERPL-CCNC: 25 U/L (ref 30–118)
APPEARANCE UR: CLEAR
APTT BLD: 30.3 SECONDS (ref 24.8–34.2)
AST SERPL-CCNC: 15 U/L (ref ?–34)
BACTERIA UR QL AUTO: NEGATIVE
BASE EXCESS BLDA CALC-SCNC: -0.5 MMOL/L (ref -2–2)
BASOPHILS # BLD AUTO: 0 10^3/UL (ref 0–0.2)
BASOPHILS NFR BLD AUTO: 0.3 % (ref 0–1)
BILIRUB CONJ SERPL-MCNC: 0.5 MG/DL (ref ?–0.4)
BILIRUB SERPL-MCNC: 1.4 MG/DL (ref 0.3–1.2)
BILIRUB UR QL STRIP.AUTO: NEGATIVE
BUN SERPL-MCNC: 11 MG/DL (ref 9–23)
CALCIUM SERPL-MCNC: 8.4 MG/DL (ref 8.5–10.1)
CHLORIDE SERPL-SCNC: 94 MMOL/L (ref 98–107)
CK MB CFR.DF SERPL CALC: 2.22
CK MB CFR.DF SERPL CALC: 2.27
CK MB SERPL-MCNC: < 1 NG/ML (ref ?–3.6)
CK MB SERPL-MCNC: < 1 NG/ML (ref ?–3.6)
CK SERPL-CCNC: 44 U/L (ref 46–171)
CK SERPL-CCNC: 45 U/L (ref 46–171)
CO2 BLDA CALC-SCNC: 23.5 MMOL/L (ref 22–29)
CO2 SERPL-SCNC: 24 MMOL/L (ref 20–31)
CREAT SERPL-MCNC: 0.81 MG/DL (ref 0.7–1.3)
CRP SERPL-MCNC: 30.6 MG/DL (ref ?–1)
EOSINOPHIL # BLD AUTO: 0.1 10^3/UL (ref 0–0.5)
EOSINOPHIL NFR BLD AUTO: 0.5 % (ref 0–3)
GFR SERPL CREATININE-BSD FRML MDRD: > 60 ML/MIN/{1.73_M2} (ref 56–?)
GLUCOSE SERPL-MCNC: 567 MG/DL (ref 60–100)
GLUCOSE UR QL STRIP.AUTO: (no result) MG/DL
HCO3 BLDA-SCNC: 22.5 MMOL/L (ref 22–26)
HCO3 STD BLDA-SCNC: 24 MMOL/L. (ref 22–26)
HCT VFR BLD AUTO: 42.1 % (ref 42–52)
HGB BLD-MCNC: 14 G/DL (ref 13.5–17.5)
HGB UR QL STRIP.AUTO: NEGATIVE
INR PPP: 1.12
KETONES UR QL STRIP.AUTO: (no result) MG/DL
LEUKOCYTE ESTERASE UR QL STRIP.AUTO: NEGATIVE
LIPASE SERPL-CCNC: 33 U/L (ref 12–53)
LYMPHOCYTES # BLD AUTO: 1.5 10^3/UL (ref 1.5–5)
LYMPHOCYTES NFR BLD AUTO: 10.6 % (ref 24–44)
MCH RBC QN AUTO: 30.3 PG (ref 27–33)
MCHC RBC AUTO-ENTMCNC: 33.3 G/DL (ref 32–36.5)
MCV RBC AUTO: 91.1 FL (ref 80–96)
MONOCYTES # BLD AUTO: 0.8 10^3/UL (ref 0–0.8)
MONOCYTES NFR BLD AUTO: 5.6 % (ref 2–8)
NEUTROPHILS # BLD AUTO: 11.8 10^3/UL (ref 1.5–8.5)
NEUTROPHILS NFR BLD AUTO: 82.3 % (ref 36–66)
NITRITE UR QL STRIP.AUTO: NEGATIVE
PCO2 BLDA: 32.7 MMHG (ref 35–45)
PH BLDA: 7.46 UNITS (ref 7.35–7.45)
PH UR STRIP.AUTO: 6 UNITS (ref 5–9)
PLATELET # BLD AUTO: 178 10^3/UL (ref 150–450)
PO2 BLDA: 67.3 MMHG (ref 75–100)
POTASSIUM SERPL-SCNC: 4.6 MMOL/L (ref 3.5–5.1)
PROCALCITONIN SERPL-MCNC: 1.46 NG/ML
PROT SERPL-MCNC: 6.7 G/DL (ref 5.7–8.2)
PROT UR QL STRIP.AUTO: NEGATIVE MG/DL
PROTHROMBIN TIME: 14.1 SECONDS (ref 12.5–14.5)
RBC # BLD AUTO: 4.62 10^6/UL (ref 4.3–6.1)
RBC # UR AUTO: 0 /HPF (ref 0–3)
RSV RNA NPH QL NAA+PROBE: NEGATIVE
SAO2 % BLDA: 95.3 % (ref 95–99)
SODIUM SERPL-SCNC: 126 MMOL/L (ref 136–145)
SP GR UR STRIP.AUTO: 1.03 (ref 1–1.03)
SQUAMOUS #/AREA URNS AUTO: 0 /HPF (ref 0–6)
UROBILINOGEN UR QL STRIP.AUTO: 0.2 MG/DL (ref 0–2)
WBC # BLD AUTO: 14.3 10^3/UL (ref 4–10)
WBC #/AREA URNS AUTO: 0 /HPF (ref 0–3)

## 2024-02-18 PROCEDURE — 0V9500Z DRAINAGE OF SCROTUM WITH DRAINAGE DEVICE, OPEN APPROACH: ICD-10-PCS | Performed by: UROLOGY

## 2024-02-18 RX ADMIN — GABAPENTIN SCH MG: 300 CAPSULE ORAL at 20:32

## 2024-02-18 RX ADMIN — INSULIN HUMAN ONE UNITS: 100 INJECTION, SOLUTION PARENTERAL at 13:34

## 2024-02-18 RX ADMIN — DEXTROSE MONOHYDRATE SCH MLS/HR: 5 INJECTION INTRAVENOUS at 19:48

## 2024-02-18 RX ADMIN — SODIUM CHLORIDE STA MLS/HR: 9 INJECTION, SOLUTION INTRAVENOUS at 12:19

## 2024-02-18 RX ADMIN — METOPROLOL SUCCINATE SCH MG: 50 TABLET, EXTENDED RELEASE ORAL at 20:32

## 2024-02-18 RX ADMIN — DEXTROSE MONOHYDRATE SCH MLS/HR: 50 INJECTION, SOLUTION INTRAVENOUS at 23:07

## 2024-02-18 RX ADMIN — DEXTROSE MONOHYDRATE ONE MLS/HR: 5 INJECTION INTRAVENOUS at 11:28

## 2024-02-18 RX ADMIN — INSULIN LISPRO PRN UNITS: 100 INJECTION, SOLUTION INTRAVENOUS; SUBCUTANEOUS at 16:59

## 2024-02-18 RX ADMIN — INSULIN LISPRO SCH UNITS: 100 INJECTION, SOLUTION INTRAVENOUS; SUBCUTANEOUS at 20:32

## 2024-02-18 RX ADMIN — INSULIN DETEMIR SCH UNITS: 100 INJECTION, SOLUTION SUBCUTANEOUS at 20:32

## 2024-02-18 RX ADMIN — ACETAMINOPHEN ONE MG: 325 TABLET ORAL at 11:28

## 2024-02-18 RX ADMIN — SODIUM CHLORIDE SCH MLS/HR: 9 INJECTION, SOLUTION INTRAVENOUS at 18:05

## 2024-02-18 RX ADMIN — INSULIN LISPRO SCH UNITS: 100 INJECTION, SOLUTION INTRAVENOUS; SUBCUTANEOUS at 18:21

## 2024-02-18 RX ADMIN — INSULIN LISPRO ONE UNITS: 100 INJECTION, SOLUTION INTRAVENOUS; SUBCUTANEOUS at 16:16

## 2024-02-18 RX ADMIN — CLINDAMYCIN PHOSPHATE STA MLS/HR: 900 INJECTION, SOLUTION INTRAVENOUS at 13:47

## 2024-02-18 RX ADMIN — DEXTROSE MONOHYDRATE ONE MLS/HR: 50 INJECTION, SOLUTION INTRAVENOUS at 14:49

## 2024-02-18 RX ADMIN — CLINDAMYCIN PHOSPHATE SCH MLS/HR: 900 INJECTION, SOLUTION INTRAVENOUS at 21:44

## 2024-02-18 RX ADMIN — DULOXETINE HYDROCHLORIDE SCH MG: 30 CAPSULE, DELAYED RELEASE ORAL at 20:33

## 2024-02-18 RX ADMIN — DEXTROSE MONOHYDRATE ONE MLS/HR: 50 INJECTION, SOLUTION INTRAVENOUS at 18:13

## 2024-02-19 VITALS — TEMPERATURE: 103.7 F | OXYGEN SATURATION: 96 % | DIASTOLIC BLOOD PRESSURE: 71 MMHG | SYSTOLIC BLOOD PRESSURE: 143 MMHG

## 2024-02-19 VITALS — DIASTOLIC BLOOD PRESSURE: 72 MMHG | TEMPERATURE: 100.3 F | SYSTOLIC BLOOD PRESSURE: 139 MMHG | OXYGEN SATURATION: 94 %

## 2024-02-19 VITALS — OXYGEN SATURATION: 96 % | SYSTOLIC BLOOD PRESSURE: 122 MMHG | DIASTOLIC BLOOD PRESSURE: 61 MMHG

## 2024-02-19 VITALS — SYSTOLIC BLOOD PRESSURE: 141 MMHG | TEMPERATURE: 96.9 F | DIASTOLIC BLOOD PRESSURE: 73 MMHG | OXYGEN SATURATION: 95 %

## 2024-02-19 VITALS — OXYGEN SATURATION: 96 % | DIASTOLIC BLOOD PRESSURE: 71 MMHG | SYSTOLIC BLOOD PRESSURE: 136 MMHG | TEMPERATURE: 100 F

## 2024-02-19 VITALS — SYSTOLIC BLOOD PRESSURE: 142 MMHG | TEMPERATURE: 96.9 F | DIASTOLIC BLOOD PRESSURE: 78 MMHG | OXYGEN SATURATION: 93 %

## 2024-02-19 VITALS — OXYGEN SATURATION: 94 % | SYSTOLIC BLOOD PRESSURE: 142 MMHG | DIASTOLIC BLOOD PRESSURE: 81 MMHG

## 2024-02-19 VITALS — SYSTOLIC BLOOD PRESSURE: 136 MMHG | DIASTOLIC BLOOD PRESSURE: 78 MMHG | OXYGEN SATURATION: 94 %

## 2024-02-19 VITALS — SYSTOLIC BLOOD PRESSURE: 146 MMHG | OXYGEN SATURATION: 94 % | DIASTOLIC BLOOD PRESSURE: 83 MMHG

## 2024-02-19 VITALS — TEMPERATURE: 102.4 F

## 2024-02-19 VITALS — OXYGEN SATURATION: 93 % | DIASTOLIC BLOOD PRESSURE: 82 MMHG | TEMPERATURE: 98.4 F | SYSTOLIC BLOOD PRESSURE: 136 MMHG

## 2024-02-19 VITALS — SYSTOLIC BLOOD PRESSURE: 143 MMHG | DIASTOLIC BLOOD PRESSURE: 79 MMHG | OXYGEN SATURATION: 95 % | TEMPERATURE: 100.1 F

## 2024-02-19 VITALS — OXYGEN SATURATION: 96 % | DIASTOLIC BLOOD PRESSURE: 73 MMHG | SYSTOLIC BLOOD PRESSURE: 129 MMHG

## 2024-02-19 VITALS — SYSTOLIC BLOOD PRESSURE: 151 MMHG | DIASTOLIC BLOOD PRESSURE: 85 MMHG | OXYGEN SATURATION: 94 %

## 2024-02-19 VITALS — TEMPERATURE: 99.5 F

## 2024-02-19 VITALS — DIASTOLIC BLOOD PRESSURE: 72 MMHG | OXYGEN SATURATION: 93 % | SYSTOLIC BLOOD PRESSURE: 140 MMHG

## 2024-02-19 LAB
ALBUMIN SERPL BCG-MCNC: 2.7 G/DL (ref 3.2–5.2)
ALP SERPL-CCNC: 134 U/L (ref 46–116)
ALT SERPL W P-5'-P-CCNC: 36 U/L (ref 7–40)
AST SERPL-CCNC: 20 U/L (ref ?–34)
BASOPHILS # BLD AUTO: 0 10^3/UL (ref 0–0.2)
BASOPHILS NFR BLD AUTO: 0.4 % (ref 0–1)
BILIRUB SERPL-MCNC: 1.3 MG/DL (ref 0.3–1.2)
BUN SERPL-MCNC: 13 MG/DL (ref 9–23)
CALCIUM SERPL-MCNC: 8.6 MG/DL (ref 8.5–10.1)
CHLORIDE SERPL-SCNC: 100 MMOL/L (ref 98–107)
CO2 SERPL-SCNC: 20 MMOL/L (ref 20–31)
CREAT SERPL-MCNC: 0.76 MG/DL (ref 0.7–1.3)
EOSINOPHIL # BLD AUTO: 0 10^3/UL (ref 0–0.5)
EOSINOPHIL NFR BLD AUTO: 0.2 % (ref 0–3)
EST. AVERAGE GLUCOSE BLD GHB EST-MCNC: 197 MG/DL (ref 60–110)
GFR SERPL CREATININE-BSD FRML MDRD: > 60 ML/MIN/{1.73_M2} (ref 56–?)
GLUCOSE SERPL-MCNC: 334 MG/DL (ref 60–100)
HCT VFR BLD AUTO: 42.2 % (ref 42–52)
HGB BLD-MCNC: 14.2 G/DL (ref 13.5–17.5)
LYMPHOCYTES # BLD AUTO: 0.7 10^3/UL (ref 1.5–5)
LYMPHOCYTES NFR BLD AUTO: 7.2 % (ref 24–44)
MAGNESIUM SERPL-MCNC: 1.9 MG/DL (ref 1.8–2.4)
MCH RBC QN AUTO: 30.5 PG (ref 27–33)
MCHC RBC AUTO-ENTMCNC: 33.6 G/DL (ref 32–36.5)
MCV RBC AUTO: 90.8 FL (ref 80–96)
MONOCYTES # BLD AUTO: 0.8 10^3/UL (ref 0–0.8)
MONOCYTES NFR BLD AUTO: 8 % (ref 2–8)
NEUTROPHILS # BLD AUTO: 8.4 10^3/UL (ref 1.5–8.5)
NEUTROPHILS NFR BLD AUTO: 83 % (ref 36–66)
PHOSPHATE SERPL-MCNC: 3.3 MG/DL (ref 2.5–4.9)
PLATELET # BLD AUTO: 185 10^3/UL (ref 150–450)
POTASSIUM SERPL-SCNC: 4 MMOL/L (ref 3.5–5.1)
PROT SERPL-MCNC: 6.7 G/DL (ref 5.7–8.2)
RBC # BLD AUTO: 4.65 10^6/UL (ref 4.3–6.1)
SODIUM SERPL-SCNC: 132 MMOL/L (ref 136–145)
T4 FREE SERPL-MCNC: 1.06 NG/DL (ref 0.89–1.76)
TSH SERPL DL<=0.005 MIU/L-ACNC: 1.23 UIU/ML (ref 0.55–4.78)
WBC # BLD AUTO: 10.2 10^3/UL (ref 4–10)

## 2024-02-19 RX ADMIN — ASPIRIN SCH MG: 81 TABLET ORAL at 08:07

## 2024-02-19 RX ADMIN — OXYCODONE HYDROCHLORIDE AND ACETAMINOPHEN SCH MG: 500 TABLET ORAL at 08:08

## 2024-02-19 RX ADMIN — ATORVASTATIN CALCIUM SCH MG: 20 TABLET, FILM COATED ORAL at 08:08

## 2024-02-19 RX ADMIN — ACETAMINOPHEN PRN MG: 325 TABLET ORAL at 00:47

## 2024-02-19 RX ADMIN — DEXTROSE MONOHYDRATE SCH MLS/HR: 50 INJECTION, SOLUTION INTRAVENOUS at 15:42

## 2024-02-19 RX ADMIN — INSULIN DETEMIR SCH UNITS: 100 INJECTION, SOLUTION SUBCUTANEOUS at 08:07

## 2024-02-19 RX ADMIN — LOSARTAN POTASSIUM SCH MG: 25 TABLET, FILM COATED ORAL at 08:08

## 2024-02-19 RX ADMIN — CLOPIDOGREL BISULFATE SCH MG: 75 TABLET ORAL at 08:08

## 2024-02-19 RX ADMIN — FEXOFENADINE HYDROCHLORIDE SCH MG: 60 TABLET ORAL at 08:07

## 2024-02-19 RX ADMIN — DAPAGLIFLOZIN SCH MG: 10 TABLET, FILM COATED ORAL at 08:08

## 2024-02-19 RX ADMIN — PROBIOTIC PRODUCT - TAB SCH EA: TAB at 13:09

## 2024-02-19 RX ADMIN — METOPROLOL TARTRATE SCH MG: 50 TABLET, FILM COATED ORAL at 21:56

## 2024-02-20 VITALS — SYSTOLIC BLOOD PRESSURE: 140 MMHG | TEMPERATURE: 98.2 F | DIASTOLIC BLOOD PRESSURE: 81 MMHG | OXYGEN SATURATION: 97 %

## 2024-02-20 VITALS — TEMPERATURE: 98.2 F | OXYGEN SATURATION: 97 % | SYSTOLIC BLOOD PRESSURE: 136 MMHG | DIASTOLIC BLOOD PRESSURE: 81 MMHG

## 2024-02-20 VITALS — TEMPERATURE: 98.9 F | OXYGEN SATURATION: 96 % | DIASTOLIC BLOOD PRESSURE: 73 MMHG | SYSTOLIC BLOOD PRESSURE: 128 MMHG

## 2024-02-20 VITALS — DIASTOLIC BLOOD PRESSURE: 75 MMHG | TEMPERATURE: 99 F | OXYGEN SATURATION: 93 % | SYSTOLIC BLOOD PRESSURE: 130 MMHG

## 2024-02-20 VITALS — OXYGEN SATURATION: 96 % | TEMPERATURE: 97.6 F | DIASTOLIC BLOOD PRESSURE: 75 MMHG | SYSTOLIC BLOOD PRESSURE: 145 MMHG

## 2024-02-20 VITALS — OXYGEN SATURATION: 96 % | SYSTOLIC BLOOD PRESSURE: 129 MMHG | DIASTOLIC BLOOD PRESSURE: 66 MMHG | TEMPERATURE: 98.9 F

## 2024-02-20 VITALS — OXYGEN SATURATION: 98 % | SYSTOLIC BLOOD PRESSURE: 139 MMHG | DIASTOLIC BLOOD PRESSURE: 75 MMHG | TEMPERATURE: 97.2 F

## 2024-02-20 LAB
BUN SERPL-MCNC: 17 MG/DL (ref 9–23)
CALCIUM SERPL-MCNC: 8.4 MG/DL (ref 8.5–10.1)
CHLORIDE SERPL-SCNC: 100 MMOL/L (ref 98–107)
CO2 SERPL-SCNC: 22 MMOL/L (ref 20–31)
CREAT SERPL-MCNC: 0.74 MG/DL (ref 0.7–1.3)
CRP SERPL-MCNC: 38.9 MG/DL (ref ?–1)
GFR SERPL CREATININE-BSD FRML MDRD: > 60 ML/MIN/{1.73_M2} (ref 56–?)
GLUCOSE SERPL-MCNC: 202 MG/DL (ref 60–100)
HCT VFR BLD AUTO: 40.3 % (ref 42–52)
HGB BLD-MCNC: 13.6 G/DL (ref 13.5–17.5)
MAGNESIUM SERPL-MCNC: 2.4 MG/DL (ref 1.8–2.4)
MCH RBC QN AUTO: 30.6 PG (ref 27–33)
MCHC RBC AUTO-ENTMCNC: 33.7 G/DL (ref 32–36.5)
MCV RBC AUTO: 90.6 FL (ref 80–96)
PLATELET # BLD AUTO: 231 10^3/UL (ref 150–450)
POTASSIUM SERPL-SCNC: 3.5 MMOL/L (ref 3.5–5.1)
RBC # BLD AUTO: 4.45 10^6/UL (ref 4.3–6.1)
SODIUM SERPL-SCNC: 134 MMOL/L (ref 136–145)
WBC # BLD AUTO: 9.4 10^3/UL (ref 4–10)

## 2024-02-20 RX ADMIN — LOPERAMIDE HYDROCHLORIDE PRN MG: 2 TABLET, FILM COATED ORAL at 06:34

## 2024-02-21 VITALS — OXYGEN SATURATION: 96 % | SYSTOLIC BLOOD PRESSURE: 145 MMHG | DIASTOLIC BLOOD PRESSURE: 71 MMHG | TEMPERATURE: 97.6 F

## 2024-02-21 VITALS — SYSTOLIC BLOOD PRESSURE: 156 MMHG | DIASTOLIC BLOOD PRESSURE: 85 MMHG | TEMPERATURE: 98.5 F | OXYGEN SATURATION: 95 %

## 2024-02-21 VITALS — OXYGEN SATURATION: 93 % | TEMPERATURE: 97.2 F | DIASTOLIC BLOOD PRESSURE: 80 MMHG | SYSTOLIC BLOOD PRESSURE: 143 MMHG

## 2024-02-21 VITALS — TEMPERATURE: 98.4 F | SYSTOLIC BLOOD PRESSURE: 145 MMHG | DIASTOLIC BLOOD PRESSURE: 73 MMHG | OXYGEN SATURATION: 94 %

## 2024-02-21 VITALS — TEMPERATURE: 97.3 F | OXYGEN SATURATION: 97 % | DIASTOLIC BLOOD PRESSURE: 82 MMHG | SYSTOLIC BLOOD PRESSURE: 153 MMHG

## 2024-02-21 LAB
ALBUMIN SERPL BCG-MCNC: 2.2 G/DL (ref 3.2–5.2)
ALP SERPL-CCNC: 147 U/L (ref 46–116)
ALT SERPL W P-5'-P-CCNC: 36 U/L (ref 7–40)
AST SERPL-CCNC: 38 U/L (ref ?–34)
BASOPHILS # BLD AUTO: 0 10^3/UL (ref 0–0.2)
BASOPHILS NFR BLD AUTO: 0.4 % (ref 0–1)
BILIRUB SERPL-MCNC: 0.5 MG/DL (ref 0.3–1.2)
BUN SERPL-MCNC: 18 MG/DL (ref 9–23)
CALCIUM SERPL-MCNC: 8.2 MG/DL (ref 8.5–10.1)
CHLORIDE SERPL-SCNC: 100 MMOL/L (ref 98–107)
CO2 SERPL-SCNC: 25 MMOL/L (ref 20–31)
CREAT SERPL-MCNC: 0.95 MG/DL (ref 0.7–1.3)
CRP SERPL-MCNC: 27 MG/DL (ref ?–1)
EOSINOPHIL # BLD AUTO: 0.4 10^3/UL (ref 0–0.5)
EOSINOPHIL NFR BLD AUTO: 5.2 % (ref 0–3)
GFR SERPL CREATININE-BSD FRML MDRD: > 60 ML/MIN/{1.73_M2} (ref 56–?)
GLUCOSE SERPL-MCNC: 282 MG/DL (ref 60–100)
HCT VFR BLD AUTO: 38.3 % (ref 42–52)
HGB BLD-MCNC: 12.9 G/DL (ref 13.5–17.5)
LYMPHOCYTES # BLD AUTO: 0.8 10^3/UL (ref 1.5–5)
LYMPHOCYTES NFR BLD AUTO: 10.7 % (ref 24–44)
MAGNESIUM SERPL-MCNC: 2.2 MG/DL (ref 1.8–2.4)
MCH RBC QN AUTO: 30.1 PG (ref 27–33)
MCHC RBC AUTO-ENTMCNC: 33.7 G/DL (ref 32–36.5)
MCV RBC AUTO: 89.5 FL (ref 80–96)
MONOCYTES # BLD AUTO: 1 10^3/UL (ref 0–0.8)
MONOCYTES NFR BLD AUTO: 13.4 % (ref 2–8)
NEUTROPHILS # BLD AUTO: 5.1 10^3/UL (ref 1.5–8.5)
NEUTROPHILS NFR BLD AUTO: 69.6 % (ref 36–66)
PLATELET # BLD AUTO: 238 10^3/UL (ref 150–450)
POTASSIUM SERPL-SCNC: 3.2 MMOL/L (ref 3.5–5.1)
PROT SERPL-MCNC: 6.1 G/DL (ref 5.7–8.2)
RBC # BLD AUTO: 4.28 10^6/UL (ref 4.3–6.1)
SODIUM SERPL-SCNC: 133 MMOL/L (ref 136–145)
WBC # BLD AUTO: 7.3 10^3/UL (ref 4–10)

## 2024-02-21 RX ADMIN — INSULIN DETEMIR SCH UNITS: 100 INJECTION, SOLUTION SUBCUTANEOUS at 09:02

## 2024-02-21 RX ADMIN — DEXTROSE MONOHYDRATE SCH MLS/HR: 5 INJECTION INTRAVENOUS at 13:57

## 2024-02-21 RX ADMIN — DEXTROSE MONOHYDRATE SCH MLS/HR: 50 INJECTION, SOLUTION INTRAVENOUS at 11:24

## 2024-02-21 RX ADMIN — SODIUM CHLORIDE SCH MLS/HR: 9 INJECTION, SOLUTION INTRAVENOUS at 12:54

## 2024-02-21 RX ADMIN — POTASSIUM CHLORIDE ONE MEQ: 750 TABLET, EXTENDED RELEASE ORAL at 18:32

## 2024-02-22 VITALS — SYSTOLIC BLOOD PRESSURE: 132 MMHG | TEMPERATURE: 98.4 F | DIASTOLIC BLOOD PRESSURE: 88 MMHG | OXYGEN SATURATION: 94 %

## 2024-02-22 VITALS — TEMPERATURE: 98.8 F | OXYGEN SATURATION: 92 % | DIASTOLIC BLOOD PRESSURE: 88 MMHG | SYSTOLIC BLOOD PRESSURE: 141 MMHG

## 2024-02-22 VITALS — SYSTOLIC BLOOD PRESSURE: 142 MMHG | TEMPERATURE: 97.6 F | OXYGEN SATURATION: 95 % | DIASTOLIC BLOOD PRESSURE: 80 MMHG

## 2024-02-22 VITALS — OXYGEN SATURATION: 94 % | TEMPERATURE: 97.6 F | SYSTOLIC BLOOD PRESSURE: 146 MMHG | DIASTOLIC BLOOD PRESSURE: 88 MMHG

## 2024-02-22 VITALS — TEMPERATURE: 99 F | SYSTOLIC BLOOD PRESSURE: 147 MMHG | DIASTOLIC BLOOD PRESSURE: 90 MMHG | OXYGEN SATURATION: 91 %

## 2024-02-22 VITALS — TEMPERATURE: 97.5 F | OXYGEN SATURATION: 96 % | DIASTOLIC BLOOD PRESSURE: 83 MMHG | SYSTOLIC BLOOD PRESSURE: 170 MMHG

## 2024-02-22 LAB
BUN SERPL-MCNC: 13 MG/DL (ref 9–23)
CALCIUM SERPL-MCNC: 8.3 MG/DL (ref 8.5–10.1)
CHLORIDE SERPL-SCNC: 103 MMOL/L (ref 98–107)
CO2 SERPL-SCNC: 25 MMOL/L (ref 20–31)
CREAT SERPL-MCNC: 0.93 MG/DL (ref 0.7–1.3)
CRP SERPL-MCNC: 20 MG/DL (ref ?–1)
GFR SERPL CREATININE-BSD FRML MDRD: > 60 ML/MIN/{1.73_M2} (ref 56–?)
GLUCOSE SERPL-MCNC: 244 MG/DL (ref 60–100)
HCT VFR BLD AUTO: 39 % (ref 42–52)
HGB BLD-MCNC: 13.2 G/DL (ref 13.5–17.5)
MAGNESIUM SERPL-MCNC: 2.1 MG/DL (ref 1.8–2.4)
MCH RBC QN AUTO: 30.2 PG (ref 27–33)
MCHC RBC AUTO-ENTMCNC: 33.8 G/DL (ref 32–36.5)
MCV RBC AUTO: 89.2 FL (ref 80–96)
PLATELET # BLD AUTO: 263 10^3/UL (ref 150–450)
POTASSIUM SERPL-SCNC: 3.4 MMOL/L (ref 3.5–5.1)
RBC # BLD AUTO: 4.37 10^6/UL (ref 4.3–6.1)
SODIUM SERPL-SCNC: 136 MMOL/L (ref 136–145)
WBC # BLD AUTO: 6.8 10^3/UL (ref 4–10)

## 2024-02-22 RX ADMIN — POTASSIUM CHLORIDE ONE MEQ: 750 TABLET, EXTENDED RELEASE ORAL at 08:36

## 2024-02-22 RX ADMIN — INSULIN DETEMIR SCH UNITS: 100 INJECTION, SOLUTION SUBCUTANEOUS at 20:08

## 2024-02-22 RX ADMIN — MORPHINE SULFATE PRN MG: 2 INJECTION, SOLUTION INTRAMUSCULAR; INTRAVENOUS at 08:30

## 2024-02-23 VITALS — TEMPERATURE: 96.6 F | DIASTOLIC BLOOD PRESSURE: 94 MMHG | SYSTOLIC BLOOD PRESSURE: 155 MMHG | OXYGEN SATURATION: 92 %

## 2024-02-23 VITALS — SYSTOLIC BLOOD PRESSURE: 149 MMHG | TEMPERATURE: 98.1 F | DIASTOLIC BLOOD PRESSURE: 89 MMHG | OXYGEN SATURATION: 90 %

## 2024-02-23 VITALS — DIASTOLIC BLOOD PRESSURE: 95 MMHG | TEMPERATURE: 97.5 F | SYSTOLIC BLOOD PRESSURE: 167 MMHG | OXYGEN SATURATION: 94 %

## 2024-02-23 VITALS — OXYGEN SATURATION: 95 %

## 2024-02-23 LAB
BUN SERPL-MCNC: 9 MG/DL (ref 9–23)
CALCIUM SERPL-MCNC: 8.4 MG/DL (ref 8.5–10.1)
CHLORIDE SERPL-SCNC: 104 MMOL/L (ref 98–107)
CO2 SERPL-SCNC: 26 MMOL/L (ref 20–31)
CREAT SERPL-MCNC: 0.92 MG/DL (ref 0.7–1.3)
GFR SERPL CREATININE-BSD FRML MDRD: > 60 ML/MIN/{1.73_M2} (ref 56–?)
GLUCOSE SERPL-MCNC: 253 MG/DL (ref 60–100)
HCT VFR BLD AUTO: 40.9 % (ref 42–52)
HGB BLD-MCNC: 13.5 G/DL (ref 13.5–17.5)
MAGNESIUM SERPL-MCNC: 1.8 MG/DL (ref 1.8–2.4)
MCH RBC QN AUTO: 29.8 PG (ref 27–33)
MCHC RBC AUTO-ENTMCNC: 33 G/DL (ref 32–36.5)
MCV RBC AUTO: 90.3 FL (ref 80–96)
PLATELET # BLD AUTO: 294 10^3/UL (ref 150–450)
POTASSIUM SERPL-SCNC: 3.3 MMOL/L (ref 3.5–5.1)
RBC # BLD AUTO: 4.53 10^6/UL (ref 4.3–6.1)
SODIUM SERPL-SCNC: 138 MMOL/L (ref 136–145)
WBC # BLD AUTO: 8.9 10^3/UL (ref 4–10)

## 2024-02-23 RX ADMIN — INSULIN DETEMIR SCH UNITS: 100 INJECTION, SOLUTION SUBCUTANEOUS at 21:45

## 2024-02-23 RX ADMIN — POTASSIUM CHLORIDE ONE MEQ: 750 TABLET, EXTENDED RELEASE ORAL at 15:23

## 2024-02-24 VITALS — SYSTOLIC BLOOD PRESSURE: 156 MMHG | DIASTOLIC BLOOD PRESSURE: 87 MMHG | TEMPERATURE: 97.9 F | OXYGEN SATURATION: 93 %

## 2024-02-24 VITALS — OXYGEN SATURATION: 95 % | SYSTOLIC BLOOD PRESSURE: 155 MMHG | TEMPERATURE: 97.5 F | DIASTOLIC BLOOD PRESSURE: 87 MMHG

## 2024-02-24 VITALS — TEMPERATURE: 97 F | SYSTOLIC BLOOD PRESSURE: 156 MMHG | OXYGEN SATURATION: 93 % | DIASTOLIC BLOOD PRESSURE: 90 MMHG

## 2024-02-24 LAB
BUN SERPL-MCNC: 9 MG/DL (ref 9–23)
CALCIUM SERPL-MCNC: 8.8 MG/DL (ref 8.5–10.1)
CHLORIDE SERPL-SCNC: 101 MMOL/L (ref 98–107)
CO2 SERPL-SCNC: 30 MMOL/L (ref 20–31)
CREAT SERPL-MCNC: 1 MG/DL (ref 0.7–1.3)
GFR SERPL CREATININE-BSD FRML MDRD: > 60 ML/MIN/{1.73_M2} (ref 56–?)
GLUCOSE SERPL-MCNC: 246 MG/DL (ref 60–100)
HCT VFR BLD AUTO: 42.4 % (ref 42–52)
HGB BLD-MCNC: 13.8 G/DL (ref 13.5–17.5)
MAGNESIUM SERPL-MCNC: 1.9 MG/DL (ref 1.8–2.4)
MCH RBC QN AUTO: 29.7 PG (ref 27–33)
MCHC RBC AUTO-ENTMCNC: 32.5 G/DL (ref 32–36.5)
MCV RBC AUTO: 91.4 FL (ref 80–96)
PLATELET # BLD AUTO: 301 10^3/UL (ref 150–450)
POTASSIUM SERPL-SCNC: 3.8 MMOL/L (ref 3.5–5.1)
RBC # BLD AUTO: 4.64 10^6/UL (ref 4.3–6.1)
SODIUM SERPL-SCNC: 138 MMOL/L (ref 136–145)
WBC # BLD AUTO: 11.4 10^3/UL (ref 4–10)

## 2024-02-25 VITALS — OXYGEN SATURATION: 95 % | TEMPERATURE: 97.7 F

## 2024-02-25 VITALS — SYSTOLIC BLOOD PRESSURE: 149 MMHG | DIASTOLIC BLOOD PRESSURE: 97 MMHG | OXYGEN SATURATION: 93 % | TEMPERATURE: 98 F

## 2024-02-25 VITALS — OXYGEN SATURATION: 94 % | DIASTOLIC BLOOD PRESSURE: 79 MMHG | SYSTOLIC BLOOD PRESSURE: 140 MMHG | TEMPERATURE: 99 F

## 2024-02-25 VITALS — TEMPERATURE: 98.4 F

## 2024-02-25 LAB
BUN SERPL-MCNC: 9 MG/DL (ref 9–23)
CALCIUM SERPL-MCNC: 8.3 MG/DL (ref 8.5–10.1)
CHLORIDE SERPL-SCNC: 102 MMOL/L (ref 98–107)
CO2 SERPL-SCNC: 28 MMOL/L (ref 20–31)
CREAT SERPL-MCNC: 0.91 MG/DL (ref 0.7–1.3)
GFR SERPL CREATININE-BSD FRML MDRD: > 60 ML/MIN/{1.73_M2} (ref 56–?)
GLUCOSE SERPL-MCNC: 183 MG/DL (ref 60–100)
HCT VFR BLD AUTO: 40.8 % (ref 42–52)
HGB BLD-MCNC: 13.4 G/DL (ref 13.5–17.5)
MAGNESIUM SERPL-MCNC: 1.7 MG/DL (ref 1.8–2.4)
MCH RBC QN AUTO: 29.8 PG (ref 27–33)
MCHC RBC AUTO-ENTMCNC: 32.8 G/DL (ref 32–36.5)
MCV RBC AUTO: 90.9 FL (ref 80–96)
PLATELET # BLD AUTO: 302 10^3/UL (ref 150–450)
POTASSIUM SERPL-SCNC: 3 MMOL/L (ref 3.5–5.1)
RBC # BLD AUTO: 4.49 10^6/UL (ref 4.3–6.1)
SODIUM SERPL-SCNC: 138 MMOL/L (ref 136–145)
WBC # BLD AUTO: 12.9 10^3/UL (ref 4–10)

## 2024-02-25 RX ADMIN — MAGNESIUM SULFATE IN DEXTROSE ONE MLS/HR: 10 INJECTION, SOLUTION INTRAVENOUS at 10:05

## 2024-02-26 VITALS — SYSTOLIC BLOOD PRESSURE: 151 MMHG | DIASTOLIC BLOOD PRESSURE: 84 MMHG | OXYGEN SATURATION: 93 % | TEMPERATURE: 97.7 F

## 2024-02-26 VITALS — TEMPERATURE: 98.8 F | SYSTOLIC BLOOD PRESSURE: 150 MMHG | OXYGEN SATURATION: 95 % | DIASTOLIC BLOOD PRESSURE: 88 MMHG

## 2024-02-26 VITALS — OXYGEN SATURATION: 93 % | SYSTOLIC BLOOD PRESSURE: 138 MMHG | DIASTOLIC BLOOD PRESSURE: 87 MMHG | TEMPERATURE: 98.1 F

## 2024-02-26 VITALS — OXYGEN SATURATION: 95 %

## 2024-02-26 VITALS — DIASTOLIC BLOOD PRESSURE: 58 MMHG | SYSTOLIC BLOOD PRESSURE: 150 MMHG

## 2024-02-26 LAB
BUN SERPL-MCNC: 9 MG/DL (ref 9–23)
CALCIUM SERPL-MCNC: 8.6 MG/DL (ref 8.5–10.1)
CHLORIDE SERPL-SCNC: 102 MMOL/L (ref 98–107)
CO2 SERPL-SCNC: 30 MMOL/L (ref 20–31)
CREAT SERPL-MCNC: 0.98 MG/DL (ref 0.7–1.3)
GFR SERPL CREATININE-BSD FRML MDRD: > 60 ML/MIN/{1.73_M2} (ref 56–?)
GLUCOSE SERPL-MCNC: 203 MG/DL (ref 60–100)
HCT VFR BLD AUTO: 42 % (ref 42–52)
HGB BLD-MCNC: 13.5 G/DL (ref 13.5–17.5)
MAGNESIUM SERPL-MCNC: 1.8 MG/DL (ref 1.8–2.4)
MCH RBC QN AUTO: 29.5 PG (ref 27–33)
MCHC RBC AUTO-ENTMCNC: 32.1 G/DL (ref 32–36.5)
MCV RBC AUTO: 91.7 FL (ref 80–96)
PLATELET # BLD AUTO: 296 10^3/UL (ref 150–450)
POTASSIUM SERPL-SCNC: 3.4 MMOL/L (ref 3.5–5.1)
RBC # BLD AUTO: 4.58 10^6/UL (ref 4.3–6.1)
SODIUM SERPL-SCNC: 139 MMOL/L (ref 136–145)
WBC # BLD AUTO: 13 10^3/UL (ref 4–10)

## 2024-02-26 RX ADMIN — ELTROMBOPAG OLAMINE SCH DOSE: 12.5 TABLET, FILM COATED ORAL at 09:37

## 2024-02-26 RX ADMIN — POTASSIUM CHLORIDE ONE MEQ: 750 TABLET, EXTENDED RELEASE ORAL at 09:36

## 2024-02-27 VITALS — DIASTOLIC BLOOD PRESSURE: 96 MMHG | OXYGEN SATURATION: 96 % | TEMPERATURE: 97.3 F | SYSTOLIC BLOOD PRESSURE: 150 MMHG

## 2024-02-27 VITALS — TEMPERATURE: 98.2 F | SYSTOLIC BLOOD PRESSURE: 142 MMHG | DIASTOLIC BLOOD PRESSURE: 83 MMHG | OXYGEN SATURATION: 91 %

## 2024-02-27 VITALS — OXYGEN SATURATION: 96 % | SYSTOLIC BLOOD PRESSURE: 149 MMHG | TEMPERATURE: 97.7 F | DIASTOLIC BLOOD PRESSURE: 94 MMHG

## 2024-02-27 VITALS — SYSTOLIC BLOOD PRESSURE: 155 MMHG | TEMPERATURE: 97.4 F | DIASTOLIC BLOOD PRESSURE: 98 MMHG | OXYGEN SATURATION: 93 %

## 2024-02-27 VITALS — TEMPERATURE: 98.1 F | DIASTOLIC BLOOD PRESSURE: 81 MMHG | OXYGEN SATURATION: 93 % | SYSTOLIC BLOOD PRESSURE: 140 MMHG

## 2024-02-27 LAB
BASOPHILS # BLD AUTO: 0.1 10^3/UL (ref 0–0.2)
BASOPHILS NFR BLD AUTO: 0.5 % (ref 0–1)
BUN SERPL-MCNC: 10 MG/DL (ref 9–23)
CALCIUM SERPL-MCNC: 8.4 MG/DL (ref 8.5–10.1)
CHLORIDE SERPL-SCNC: 105 MMOL/L (ref 98–107)
CO2 SERPL-SCNC: 30 MMOL/L (ref 20–31)
CREAT SERPL-MCNC: 0.89 MG/DL (ref 0.7–1.3)
EOSINOPHIL # BLD AUTO: 0.4 10^3/UL (ref 0–0.5)
EOSINOPHIL NFR BLD AUTO: 3.2 % (ref 0–3)
GFR SERPL CREATININE-BSD FRML MDRD: > 60 ML/MIN/{1.73_M2} (ref 56–?)
GLUCOSE SERPL-MCNC: 186 MG/DL (ref 60–100)
HCT VFR BLD AUTO: 39.6 % (ref 42–52)
HGB BLD-MCNC: 12.9 G/DL (ref 13.5–17.5)
LYMPHOCYTES # BLD AUTO: 1.7 10^3/UL (ref 1.5–5)
LYMPHOCYTES NFR BLD AUTO: 12.6 % (ref 24–44)
MCH RBC QN AUTO: 29.9 PG (ref 27–33)
MCHC RBC AUTO-ENTMCNC: 32.6 G/DL (ref 32–36.5)
MCV RBC AUTO: 91.7 FL (ref 80–96)
MONOCYTES # BLD AUTO: 0.7 10^3/UL (ref 0–0.8)
MONOCYTES NFR BLD AUTO: 5.3 % (ref 2–8)
NEUTROPHILS # BLD AUTO: 10.6 10^3/UL (ref 1.5–8.5)
NEUTROPHILS NFR BLD AUTO: 77 % (ref 36–66)
PLATELET # BLD AUTO: 281 10^3/UL (ref 150–450)
POTASSIUM SERPL-SCNC: 3.3 MMOL/L (ref 3.5–5.1)
RBC # BLD AUTO: 4.32 10^6/UL (ref 4.3–6.1)
SODIUM SERPL-SCNC: 140 MMOL/L (ref 136–145)
WBC # BLD AUTO: 13.7 10^3/UL (ref 4–10)

## 2024-02-27 RX ADMIN — SODIUM CHLORIDE STA MLS/HR: 9 INJECTION, SOLUTION INTRAVENOUS at 02:44

## 2024-02-27 RX ADMIN — DEXTROSE MONOHYDRATE SCH MLS/HR: 5 INJECTION INTRAVENOUS at 17:18

## 2024-02-27 RX ADMIN — POTASSIUM CHLORIDE SCH MEQ: 750 TABLET, EXTENDED RELEASE ORAL at 08:21

## 2024-02-28 VITALS — SYSTOLIC BLOOD PRESSURE: 115 MMHG | DIASTOLIC BLOOD PRESSURE: 94 MMHG | TEMPERATURE: 97.9 F | OXYGEN SATURATION: 93 %

## 2024-02-28 VITALS — DIASTOLIC BLOOD PRESSURE: 96 MMHG | OXYGEN SATURATION: 94 % | TEMPERATURE: 97.5 F | SYSTOLIC BLOOD PRESSURE: 157 MMHG

## 2024-02-28 VITALS — OXYGEN SATURATION: 96 %

## 2024-02-28 VITALS — DIASTOLIC BLOOD PRESSURE: 95 MMHG | SYSTOLIC BLOOD PRESSURE: 153 MMHG | TEMPERATURE: 97.5 F | OXYGEN SATURATION: 94 %

## 2024-02-28 LAB
BASOPHILS # BLD AUTO: 0.1 10^3/UL (ref 0–0.2)
BASOPHILS NFR BLD AUTO: 0.5 % (ref 0–1)
BUN SERPL-MCNC: 8 MG/DL (ref 9–23)
CALCIUM SERPL-MCNC: 8 MG/DL (ref 8.5–10.1)
CHLORIDE SERPL-SCNC: 104 MMOL/L (ref 98–107)
CO2 SERPL-SCNC: 30 MMOL/L (ref 20–31)
CREAT SERPL-MCNC: 0.79 MG/DL (ref 0.7–1.3)
CRP SERPL-MCNC: 6.8 MG/DL (ref ?–1)
EOSINOPHIL # BLD AUTO: 0.4 10^3/UL (ref 0–0.5)
EOSINOPHIL NFR BLD AUTO: 3 % (ref 0–3)
GFR SERPL CREATININE-BSD FRML MDRD: > 60 ML/MIN/{1.73_M2} (ref 56–?)
GLUCOSE SERPL-MCNC: 200 MG/DL (ref 60–100)
HCT VFR BLD AUTO: 38.9 % (ref 42–52)
HGB BLD-MCNC: 12.7 G/DL (ref 13.5–17.5)
LYMPHOCYTES # BLD AUTO: 1.5 10^3/UL (ref 1.5–5)
LYMPHOCYTES NFR BLD AUTO: 12.1 % (ref 24–44)
MAGNESIUM SERPL-MCNC: 1.5 MG/DL (ref 1.8–2.4)
MCH RBC QN AUTO: 29.8 PG (ref 27–33)
MCHC RBC AUTO-ENTMCNC: 32.6 G/DL (ref 32–36.5)
MCV RBC AUTO: 91.3 FL (ref 80–96)
MONOCYTES # BLD AUTO: 0.7 10^3/UL (ref 0–0.8)
MONOCYTES NFR BLD AUTO: 5.4 % (ref 2–8)
NEUTROPHILS # BLD AUTO: 9.9 10^3/UL (ref 1.5–8.5)
NEUTROPHILS NFR BLD AUTO: 78.1 % (ref 36–66)
PLATELET # BLD AUTO: 258 10^3/UL (ref 150–450)
POTASSIUM SERPL-SCNC: 3.6 MMOL/L (ref 3.5–5.1)
RBC # BLD AUTO: 4.26 10^6/UL (ref 4.3–6.1)
SODIUM SERPL-SCNC: 139 MMOL/L (ref 136–145)
WBC # BLD AUTO: 12.6 10^3/UL (ref 4–10)

## 2024-02-28 RX ADMIN — MAGNESIUM OXIDE SCH MG: 400 TABLET ORAL at 21:02

## 2024-02-28 RX ADMIN — MAGNESIUM SULFATE IN DEXTROSE SCH MLS/HR: 10 INJECTION, SOLUTION INTRAVENOUS at 21:10

## 2024-02-29 VITALS — TEMPERATURE: 97.9 F | DIASTOLIC BLOOD PRESSURE: 120 MMHG | OXYGEN SATURATION: 93 % | SYSTOLIC BLOOD PRESSURE: 180 MMHG

## 2024-02-29 VITALS — OXYGEN SATURATION: 94 % | SYSTOLIC BLOOD PRESSURE: 168 MMHG | DIASTOLIC BLOOD PRESSURE: 105 MMHG | TEMPERATURE: 96.6 F

## 2024-02-29 VITALS — DIASTOLIC BLOOD PRESSURE: 104 MMHG | SYSTOLIC BLOOD PRESSURE: 167 MMHG | OXYGEN SATURATION: 94 % | TEMPERATURE: 97.5 F

## 2024-02-29 VITALS — OXYGEN SATURATION: 96 % | DIASTOLIC BLOOD PRESSURE: 106 MMHG | SYSTOLIC BLOOD PRESSURE: 173 MMHG | TEMPERATURE: 96.8 F

## 2024-02-29 VITALS — OXYGEN SATURATION: 92 % | TEMPERATURE: 97.7 F | DIASTOLIC BLOOD PRESSURE: 79 MMHG | SYSTOLIC BLOOD PRESSURE: 143 MMHG

## 2024-02-29 VITALS — DIASTOLIC BLOOD PRESSURE: 84 MMHG | OXYGEN SATURATION: 94 % | SYSTOLIC BLOOD PRESSURE: 142 MMHG | TEMPERATURE: 97 F

## 2024-02-29 VITALS — SYSTOLIC BLOOD PRESSURE: 170 MMHG | DIASTOLIC BLOOD PRESSURE: 102 MMHG

## 2024-02-29 VITALS — DIASTOLIC BLOOD PRESSURE: 122 MMHG | TEMPERATURE: 97.7 F | OXYGEN SATURATION: 94 % | SYSTOLIC BLOOD PRESSURE: 172 MMHG

## 2024-02-29 VITALS — SYSTOLIC BLOOD PRESSURE: 171 MMHG | TEMPERATURE: 97.5 F | DIASTOLIC BLOOD PRESSURE: 107 MMHG | OXYGEN SATURATION: 93 %

## 2024-02-29 VITALS — TEMPERATURE: 96.8 F | OXYGEN SATURATION: 95 % | DIASTOLIC BLOOD PRESSURE: 105 MMHG | SYSTOLIC BLOOD PRESSURE: 167 MMHG

## 2024-02-29 LAB
BASOPHILS # BLD AUTO: 0.1 10^3/UL (ref 0–0.2)
BASOPHILS NFR BLD AUTO: 0.7 % (ref 0–1)
BUN SERPL-MCNC: 9 MG/DL (ref 9–23)
CALCIUM SERPL-MCNC: 8.5 MG/DL (ref 8.5–10.1)
CHLORIDE SERPL-SCNC: 104 MMOL/L (ref 98–107)
CO2 SERPL-SCNC: 29 MMOL/L (ref 20–31)
CREAT SERPL-MCNC: 0.82 MG/DL (ref 0.7–1.3)
EOSINOPHIL # BLD AUTO: 0.3 10^3/UL (ref 0–0.5)
EOSINOPHIL NFR BLD AUTO: 3 % (ref 0–3)
GFR SERPL CREATININE-BSD FRML MDRD: > 60 ML/MIN/{1.73_M2} (ref 56–?)
GLUCOSE SERPL-MCNC: 191 MG/DL (ref 60–100)
HCT VFR BLD AUTO: 39.6 % (ref 42–52)
HGB BLD-MCNC: 12.7 G/DL (ref 13.5–17.5)
LYMPHOCYTES # BLD AUTO: 1.7 10^3/UL (ref 1.5–5)
LYMPHOCYTES NFR BLD AUTO: 15.3 % (ref 24–44)
MAGNESIUM SERPL-MCNC: 1.9 MG/DL (ref 1.8–2.4)
MCH RBC QN AUTO: 29.5 PG (ref 27–33)
MCHC RBC AUTO-ENTMCNC: 32.1 G/DL (ref 32–36.5)
MCV RBC AUTO: 92.1 FL (ref 80–96)
MONOCYTES # BLD AUTO: 0.7 10^3/UL (ref 0–0.8)
MONOCYTES NFR BLD AUTO: 5.9 % (ref 2–8)
NEUTROPHILS # BLD AUTO: 8.3 10^3/UL (ref 1.5–8.5)
NEUTROPHILS NFR BLD AUTO: 74.2 % (ref 36–66)
PLATELET # BLD AUTO: 248 10^3/UL (ref 150–450)
POTASSIUM SERPL-SCNC: 4.4 MMOL/L (ref 3.5–5.1)
RBC # BLD AUTO: 4.3 10^6/UL (ref 4.3–6.1)
SODIUM SERPL-SCNC: 140 MMOL/L (ref 136–145)
WBC # BLD AUTO: 11.2 10^3/UL (ref 4–10)

## 2024-02-29 PROCEDURE — 0V950ZZ DRAINAGE OF SCROTUM, OPEN APPROACH: ICD-10-PCS | Performed by: SPECIALIST

## 2024-02-29 RX ADMIN — DIPHENHYDRAMINE HYDROCHLORIDE ONE MG: 25 CAPSULE ORAL at 06:20

## 2024-02-29 RX ADMIN — METOPROLOL TARTRATE ONE MG: 25 TABLET, FILM COATED ORAL at 22:00

## 2024-03-01 VITALS — DIASTOLIC BLOOD PRESSURE: 81 MMHG | OXYGEN SATURATION: 95 % | TEMPERATURE: 98.1 F | SYSTOLIC BLOOD PRESSURE: 124 MMHG

## 2024-03-01 VITALS — DIASTOLIC BLOOD PRESSURE: 98 MMHG | OXYGEN SATURATION: 95 % | SYSTOLIC BLOOD PRESSURE: 168 MMHG

## 2024-03-01 VITALS — DIASTOLIC BLOOD PRESSURE: 95 MMHG | TEMPERATURE: 97.7 F | SYSTOLIC BLOOD PRESSURE: 147 MMHG | OXYGEN SATURATION: 95 %

## 2024-03-01 VITALS — TEMPERATURE: 98.1 F | OXYGEN SATURATION: 92 % | DIASTOLIC BLOOD PRESSURE: 82 MMHG | SYSTOLIC BLOOD PRESSURE: 125 MMHG

## 2024-03-01 VITALS — DIASTOLIC BLOOD PRESSURE: 80 MMHG | OXYGEN SATURATION: 91 % | SYSTOLIC BLOOD PRESSURE: 110 MMHG | TEMPERATURE: 98.1 F

## 2024-03-01 VITALS — TEMPERATURE: 97.9 F | SYSTOLIC BLOOD PRESSURE: 170 MMHG | OXYGEN SATURATION: 93 % | DIASTOLIC BLOOD PRESSURE: 100 MMHG

## 2024-03-01 VITALS — SYSTOLIC BLOOD PRESSURE: 159 MMHG | OXYGEN SATURATION: 95 % | TEMPERATURE: 97.3 F | DIASTOLIC BLOOD PRESSURE: 101 MMHG

## 2024-03-01 LAB
BASOPHILS # BLD AUTO: 0.1 10^3/UL (ref 0–0.2)
BASOPHILS NFR BLD AUTO: 0.7 % (ref 0–1)
BUN SERPL-MCNC: 12 MG/DL (ref 9–23)
CALCIUM SERPL-MCNC: 8.7 MG/DL (ref 8.5–10.1)
CHLORIDE SERPL-SCNC: 103 MMOL/L (ref 98–107)
CO2 SERPL-SCNC: 30 MMOL/L (ref 20–31)
CREAT SERPL-MCNC: 0.81 MG/DL (ref 0.7–1.3)
EOSINOPHIL # BLD AUTO: 0.1 10^3/UL (ref 0–0.5)
EOSINOPHIL NFR BLD AUTO: 0.8 % (ref 0–3)
GFR SERPL CREATININE-BSD FRML MDRD: > 60 ML/MIN/{1.73_M2} (ref 56–?)
GLUCOSE SERPL-MCNC: 227 MG/DL (ref 60–100)
HCT VFR BLD AUTO: 37.6 % (ref 42–52)
HGB BLD-MCNC: 12 G/DL (ref 13.5–17.5)
LYMPHOCYTES # BLD AUTO: 1.8 10^3/UL (ref 1.5–5)
LYMPHOCYTES NFR BLD AUTO: 14.2 % (ref 24–44)
MAGNESIUM SERPL-MCNC: 1.9 MG/DL (ref 1.8–2.4)
MCH RBC QN AUTO: 29.4 PG (ref 27–33)
MCHC RBC AUTO-ENTMCNC: 31.9 G/DL (ref 32–36.5)
MCV RBC AUTO: 92.2 FL (ref 80–96)
MONOCYTES # BLD AUTO: 0.6 10^3/UL (ref 0–0.8)
MONOCYTES NFR BLD AUTO: 4.9 % (ref 2–8)
NEUTROPHILS # BLD AUTO: 9.9 10^3/UL (ref 1.5–8.5)
NEUTROPHILS NFR BLD AUTO: 78.8 % (ref 36–66)
PLATELET # BLD AUTO: 283 10^3/UL (ref 150–450)
POTASSIUM SERPL-SCNC: 5.1 MMOL/L (ref 3.5–5.1)
RBC # BLD AUTO: 4.08 10^6/UL (ref 4.3–6.1)
SODIUM SERPL-SCNC: 138 MMOL/L (ref 136–145)
WBC # BLD AUTO: 12.5 10^3/UL (ref 4–10)

## 2024-03-01 RX ADMIN — SODIUM CHLORIDE SCH UNITS: 4.5 INJECTION, SOLUTION INTRAVENOUS at 09:26

## 2024-03-02 VITALS — SYSTOLIC BLOOD PRESSURE: 156 MMHG | OXYGEN SATURATION: 94 % | DIASTOLIC BLOOD PRESSURE: 104 MMHG | TEMPERATURE: 97.3 F

## 2024-03-02 VITALS — TEMPERATURE: 97.9 F | SYSTOLIC BLOOD PRESSURE: 105 MMHG | DIASTOLIC BLOOD PRESSURE: 65 MMHG | OXYGEN SATURATION: 96 %

## 2024-03-02 VITALS — OXYGEN SATURATION: 94 % | TEMPERATURE: 97.7 F | SYSTOLIC BLOOD PRESSURE: 156 MMHG | DIASTOLIC BLOOD PRESSURE: 95 MMHG

## 2024-03-02 LAB
BASOPHILS # BLD AUTO: 0.1 10^3/UL (ref 0–0.2)
BASOPHILS NFR BLD AUTO: 1.3 % (ref 0–1)
BUN SERPL-MCNC: 13 MG/DL (ref 9–23)
CALCIUM SERPL-MCNC: 8.6 MG/DL (ref 8.5–10.1)
CHLORIDE SERPL-SCNC: 102 MMOL/L (ref 98–107)
CO2 SERPL-SCNC: 30 MMOL/L (ref 20–31)
CREAT SERPL-MCNC: 0.82 MG/DL (ref 0.7–1.3)
EOSINOPHIL # BLD AUTO: 0.3 10^3/UL (ref 0–0.5)
EOSINOPHIL NFR BLD AUTO: 3 % (ref 0–3)
GFR SERPL CREATININE-BSD FRML MDRD: > 60 ML/MIN/{1.73_M2} (ref 56–?)
GLUCOSE SERPL-MCNC: 265 MG/DL (ref 60–100)
HCT VFR BLD AUTO: 35.9 % (ref 42–52)
HGB BLD-MCNC: 11.5 G/DL (ref 13.5–17.5)
LYMPHOCYTES # BLD AUTO: 2.2 10^3/UL (ref 1.5–5)
LYMPHOCYTES NFR BLD AUTO: 21.8 % (ref 24–44)
MAGNESIUM SERPL-MCNC: 1.8 MG/DL (ref 1.8–2.4)
MCH RBC QN AUTO: 29.8 PG (ref 27–33)
MCHC RBC AUTO-ENTMCNC: 32 G/DL (ref 32–36.5)
MCV RBC AUTO: 93 FL (ref 80–96)
MONOCYTES # BLD AUTO: 0.7 10^3/UL (ref 0–0.8)
MONOCYTES NFR BLD AUTO: 7.2 % (ref 2–8)
NEUTROPHILS # BLD AUTO: 6.5 10^3/UL (ref 1.5–8.5)
NEUTROPHILS NFR BLD AUTO: 66 % (ref 36–66)
PLATELET # BLD AUTO: 254 10^3/UL (ref 150–450)
POTASSIUM SERPL-SCNC: 4.6 MMOL/L (ref 3.5–5.1)
RBC # BLD AUTO: 3.86 10^6/UL (ref 4.3–6.1)
SODIUM SERPL-SCNC: 137 MMOL/L (ref 136–145)
WBC # BLD AUTO: 9.9 10^3/UL (ref 4–10)

## 2024-03-02 RX ADMIN — INSULIN DETEMIR SCH UNITS: 100 INJECTION, SOLUTION SUBCUTANEOUS at 09:00

## 2024-03-03 VITALS — DIASTOLIC BLOOD PRESSURE: 76 MMHG | SYSTOLIC BLOOD PRESSURE: 123 MMHG | OXYGEN SATURATION: 97 % | TEMPERATURE: 97.9 F

## 2024-03-03 VITALS — TEMPERATURE: 98.1 F | SYSTOLIC BLOOD PRESSURE: 123 MMHG | DIASTOLIC BLOOD PRESSURE: 75 MMHG | OXYGEN SATURATION: 96 %

## 2024-03-03 VITALS — TEMPERATURE: 97.5 F | OXYGEN SATURATION: 96 % | DIASTOLIC BLOOD PRESSURE: 99 MMHG | SYSTOLIC BLOOD PRESSURE: 140 MMHG

## 2024-03-03 LAB
BASOPHILS # BLD AUTO: 0.2 10^3/UL (ref 0–0.2)
BASOPHILS NFR BLD AUTO: 1.5 % (ref 0–1)
BUN SERPL-MCNC: 19 MG/DL (ref 9–23)
CALCIUM SERPL-MCNC: 8.4 MG/DL (ref 8.5–10.1)
CHLORIDE SERPL-SCNC: 102 MMOL/L (ref 98–107)
CO2 SERPL-SCNC: 29 MMOL/L (ref 20–31)
CREAT SERPL-MCNC: 0.8 MG/DL (ref 0.7–1.3)
EOSINOPHIL # BLD AUTO: 0.3 10^3/UL (ref 0–0.5)
EOSINOPHIL NFR BLD AUTO: 2.3 % (ref 0–3)
GFR SERPL CREATININE-BSD FRML MDRD: > 60 ML/MIN/{1.73_M2} (ref 56–?)
GLUCOSE SERPL-MCNC: 301 MG/DL (ref 60–100)
HCT VFR BLD AUTO: 34.7 % (ref 42–52)
HGB BLD-MCNC: 11.1 G/DL (ref 13.5–17.5)
LYMPHOCYTES # BLD AUTO: 2.5 10^3/UL (ref 1.5–5)
LYMPHOCYTES NFR BLD AUTO: 20.5 % (ref 24–44)
MAGNESIUM SERPL-MCNC: 1.7 MG/DL (ref 1.8–2.4)
MCH RBC QN AUTO: 29.8 PG (ref 27–33)
MCHC RBC AUTO-ENTMCNC: 32 G/DL (ref 32–36.5)
MCV RBC AUTO: 93.3 FL (ref 80–96)
MONOCYTES # BLD AUTO: 0.8 10^3/UL (ref 0–0.8)
MONOCYTES NFR BLD AUTO: 6.8 % (ref 2–8)
NEUTROPHILS # BLD AUTO: 8.2 10^3/UL (ref 1.5–8.5)
NEUTROPHILS NFR BLD AUTO: 68.1 % (ref 36–66)
PLATELET # BLD AUTO: 268 10^3/UL (ref 150–450)
POTASSIUM SERPL-SCNC: 4.6 MMOL/L (ref 3.5–5.1)
RBC # BLD AUTO: 3.72 10^6/UL (ref 4.3–6.1)
SODIUM SERPL-SCNC: 136 MMOL/L (ref 136–145)
WBC # BLD AUTO: 12 10^3/UL (ref 4–10)

## 2024-03-03 RX ADMIN — MORPHINE SULFATE PRN MG: 4 INJECTION INTRAVENOUS at 16:39

## 2024-03-03 RX ADMIN — INSULIN LISPRO SCH UNITS: 100 INJECTION, SOLUTION INTRAVENOUS; SUBCUTANEOUS at 09:39

## 2024-03-03 RX ADMIN — INSULIN LISPRO SCH UNITS: 100 INJECTION, SOLUTION INTRAVENOUS; SUBCUTANEOUS at 12:00

## 2024-03-03 RX ADMIN — MAGNESIUM SULFATE IN DEXTROSE ONE MLS/HR: 10 INJECTION, SOLUTION INTRAVENOUS at 09:39

## 2024-03-03 RX ADMIN — INSULIN DETEMIR SCH UNITS: 100 INJECTION, SOLUTION SUBCUTANEOUS at 09:00

## 2024-03-04 VITALS — OXYGEN SATURATION: 100 % | SYSTOLIC BLOOD PRESSURE: 119 MMHG | TEMPERATURE: 97.6 F | DIASTOLIC BLOOD PRESSURE: 66 MMHG

## 2024-03-04 VITALS — OXYGEN SATURATION: 97 % | DIASTOLIC BLOOD PRESSURE: 63 MMHG | TEMPERATURE: 97.3 F | SYSTOLIC BLOOD PRESSURE: 116 MMHG

## 2024-03-04 VITALS — DIASTOLIC BLOOD PRESSURE: 59 MMHG | SYSTOLIC BLOOD PRESSURE: 100 MMHG | OXYGEN SATURATION: 92 %

## 2024-03-04 VITALS — OXYGEN SATURATION: 99 % | DIASTOLIC BLOOD PRESSURE: 67 MMHG | SYSTOLIC BLOOD PRESSURE: 120 MMHG

## 2024-03-04 VITALS — DIASTOLIC BLOOD PRESSURE: 53 MMHG | SYSTOLIC BLOOD PRESSURE: 96 MMHG

## 2024-03-04 VITALS — SYSTOLIC BLOOD PRESSURE: 122 MMHG | DIASTOLIC BLOOD PRESSURE: 66 MMHG | OXYGEN SATURATION: 98 % | TEMPERATURE: 97.4 F

## 2024-03-04 VITALS — OXYGEN SATURATION: 98 % | SYSTOLIC BLOOD PRESSURE: 123 MMHG | DIASTOLIC BLOOD PRESSURE: 72 MMHG

## 2024-03-04 VITALS — OXYGEN SATURATION: 96 % | TEMPERATURE: 97.5 F | SYSTOLIC BLOOD PRESSURE: 131 MMHG | DIASTOLIC BLOOD PRESSURE: 90 MMHG

## 2024-03-04 VITALS — OXYGEN SATURATION: 97 % | DIASTOLIC BLOOD PRESSURE: 69 MMHG | SYSTOLIC BLOOD PRESSURE: 123 MMHG

## 2024-03-04 VITALS — OXYGEN SATURATION: 98 % | SYSTOLIC BLOOD PRESSURE: 118 MMHG | DIASTOLIC BLOOD PRESSURE: 70 MMHG

## 2024-03-04 VITALS — OXYGEN SATURATION: 98 % | SYSTOLIC BLOOD PRESSURE: 123 MMHG | DIASTOLIC BLOOD PRESSURE: 75 MMHG

## 2024-03-04 VITALS — TEMPERATURE: 97.4 F | OXYGEN SATURATION: 99 % | SYSTOLIC BLOOD PRESSURE: 127 MMHG | DIASTOLIC BLOOD PRESSURE: 70 MMHG

## 2024-03-04 VITALS — OXYGEN SATURATION: 73 %

## 2024-03-04 VITALS — DIASTOLIC BLOOD PRESSURE: 70 MMHG | OXYGEN SATURATION: 98 % | SYSTOLIC BLOOD PRESSURE: 126 MMHG

## 2024-03-04 VITALS — OXYGEN SATURATION: 99 % | DIASTOLIC BLOOD PRESSURE: 71 MMHG | SYSTOLIC BLOOD PRESSURE: 148 MMHG

## 2024-03-04 VITALS — DIASTOLIC BLOOD PRESSURE: 64 MMHG | TEMPERATURE: 97.2 F | OXYGEN SATURATION: 97 % | SYSTOLIC BLOOD PRESSURE: 117 MMHG

## 2024-03-04 VITALS — OXYGEN SATURATION: 97 % | DIASTOLIC BLOOD PRESSURE: 72 MMHG | SYSTOLIC BLOOD PRESSURE: 125 MMHG

## 2024-03-04 VITALS — DIASTOLIC BLOOD PRESSURE: 69 MMHG | OXYGEN SATURATION: 98 % | SYSTOLIC BLOOD PRESSURE: 127 MMHG

## 2024-03-04 VITALS — DIASTOLIC BLOOD PRESSURE: 68 MMHG | OXYGEN SATURATION: 96 % | SYSTOLIC BLOOD PRESSURE: 116 MMHG

## 2024-03-04 VITALS — SYSTOLIC BLOOD PRESSURE: 143 MMHG | OXYGEN SATURATION: 93 % | DIASTOLIC BLOOD PRESSURE: 61 MMHG

## 2024-03-04 VITALS — OXYGEN SATURATION: 98 % | SYSTOLIC BLOOD PRESSURE: 122 MMHG | TEMPERATURE: 98.4 F | DIASTOLIC BLOOD PRESSURE: 63 MMHG

## 2024-03-04 VITALS — DIASTOLIC BLOOD PRESSURE: 75 MMHG | SYSTOLIC BLOOD PRESSURE: 136 MMHG | OXYGEN SATURATION: 98 %

## 2024-03-04 VITALS — TEMPERATURE: 99.3 F

## 2024-03-04 VITALS — DIASTOLIC BLOOD PRESSURE: 64 MMHG | OXYGEN SATURATION: 98 % | SYSTOLIC BLOOD PRESSURE: 120 MMHG

## 2024-03-04 VITALS — OXYGEN SATURATION: 98 %

## 2024-03-04 VITALS — SYSTOLIC BLOOD PRESSURE: 105 MMHG | DIASTOLIC BLOOD PRESSURE: 52 MMHG

## 2024-03-04 LAB
ALBUMIN SERPL BCG-MCNC: 2.2 G/DL (ref 3.2–5.2)
ALP SERPL-CCNC: 84 U/L (ref 46–116)
ALT SERPL W P-5'-P-CCNC: 14 U/L (ref 7–40)
AST SERPL-CCNC: 13 U/L (ref ?–34)
BASOPHILS # BLD AUTO: 0.3 10^3/UL (ref 0–0.2)
BASOPHILS # BLD AUTO: 0.3 10^3/UL (ref 0–0.2)
BASOPHILS NFR BLD AUTO: 1.3 % (ref 0–1)
BASOPHILS NFR BLD AUTO: 1.5 % (ref 0–1)
BILIRUB CONJ SERPL-MCNC: 0.2 MG/DL (ref ?–0.4)
BILIRUB SERPL-MCNC: 0.5 MG/DL (ref 0.3–1.2)
BUN SERPL-MCNC: 21 MG/DL (ref 9–23)
BUN SERPL-MCNC: 24 MG/DL (ref 9–23)
BUN SERPL-MCNC: 28 MG/DL (ref 9–23)
CALCIUM SERPL-MCNC: 7.6 MG/DL (ref 8.5–10.1)
CALCIUM SERPL-MCNC: 8 MG/DL (ref 8.5–10.1)
CALCIUM SERPL-MCNC: 8.3 MG/DL (ref 8.5–10.1)
CHLORIDE SERPL-SCNC: 100 MMOL/L (ref 98–107)
CHLORIDE SERPL-SCNC: 101 MMOL/L (ref 98–107)
CHLORIDE SERPL-SCNC: 102 MMOL/L (ref 98–107)
CHOLEST SERPL-MCNC: 151 MG/DL (ref ?–200)
CHOLEST/HDLC SERPL: 4.58 {RATIO} (ref ?–5)
CK SERPL-CCNC: < 15 U/L (ref 46–171)
CO2 SERPL-SCNC: 24 MMOL/L (ref 20–31)
CO2 SERPL-SCNC: 26 MMOL/L (ref 20–31)
CO2 SERPL-SCNC: 28 MMOL/L (ref 20–31)
CREAT SERPL-MCNC: 0.82 MG/DL (ref 0.7–1.3)
CREAT SERPL-MCNC: 0.85 MG/DL (ref 0.7–1.3)
CREAT SERPL-MCNC: 1 MG/DL (ref 0.7–1.3)
EOSINOPHIL # BLD AUTO: 0.3 10^3/UL (ref 0–0.5)
EOSINOPHIL # BLD AUTO: 0.4 10^3/UL (ref 0–0.5)
EOSINOPHIL NFR BLD AUTO: 1.3 % (ref 0–3)
EOSINOPHIL NFR BLD AUTO: 2.5 % (ref 0–3)
GFR SERPL CREATININE-BSD FRML MDRD: > 60 ML/MIN/{1.73_M2} (ref 56–?)
GLUCOSE SERPL-MCNC: 293 MG/DL (ref 60–100)
GLUCOSE SERPL-MCNC: 325 MG/DL (ref 60–100)
GLUCOSE SERPL-MCNC: 493 MG/DL (ref 60–100)
HCT VFR BLD AUTO: 24.4 % (ref 42–52)
HCT VFR BLD AUTO: 26.1 % (ref 42–52)
HCT VFR BLD AUTO: 26.4 % (ref 42–52)
HCT VFR BLD AUTO: 31 % (ref 42–52)
HDLC SERPL-MCNC: 32.9 MG/DL (ref 40–?)
HGB BLD-MCNC: 8 G/DL (ref 13.5–17.5)
HGB BLD-MCNC: 8.2 G/DL (ref 13.5–17.5)
HGB BLD-MCNC: 8.7 G/DL (ref 13.5–17.5)
HGB BLD-MCNC: 9.9 G/DL (ref 13.5–17.5)
LDH SERPL L TO P-CCNC: 170 U/L (ref 120–246)
LDLC SERPL CALC-MCNC: 68.9 MG/DL (ref ?–100)
LYMPHOCYTES # BLD AUTO: 3.5 10^3/UL (ref 1.5–5)
LYMPHOCYTES # BLD AUTO: 4.3 10^3/UL (ref 1.5–5)
LYMPHOCYTES NFR BLD AUTO: 20.3 % (ref 24–44)
LYMPHOCYTES NFR BLD AUTO: 21.4 % (ref 24–44)
MAGNESIUM SERPL-MCNC: 1.7 MG/DL (ref 1.8–2.4)
MCH RBC QN AUTO: 29.6 PG (ref 27–33)
MCH RBC QN AUTO: 29.7 PG (ref 27–33)
MCHC RBC AUTO-ENTMCNC: 31.4 G/DL (ref 32–36.5)
MCHC RBC AUTO-ENTMCNC: 31.9 G/DL (ref 32–36.5)
MCV RBC AUTO: 92.8 FL (ref 80–96)
MCV RBC AUTO: 94.6 FL (ref 80–96)
MONOCYTES # BLD AUTO: 1 10^3/UL (ref 0–0.8)
MONOCYTES # BLD AUTO: 1.1 10^3/UL (ref 0–0.8)
MONOCYTES NFR BLD AUTO: 5.2 % (ref 2–8)
MONOCYTES NFR BLD AUTO: 5.8 % (ref 2–8)
NEUTROPHILS # BLD AUTO: 11.2 10^3/UL (ref 1.5–8.5)
NEUTROPHILS # BLD AUTO: 14.8 10^3/UL (ref 1.5–8.5)
NEUTROPHILS NFR BLD AUTO: 67.9 % (ref 36–66)
NEUTROPHILS NFR BLD AUTO: 70.8 % (ref 36–66)
NONHDLC SERPL-MCNC: 118.1 MG/DL
PHOSPHATE SERPL-MCNC: 4.2 MG/DL (ref 2.5–4.9)
PLATELET # BLD AUTO: 341 10^3/UL (ref 150–450)
PLATELET # BLD AUTO: 371 10^3/UL (ref 150–450)
POTASSIUM SERPL-SCNC: 4.2 MMOL/L (ref 3.5–5.1)
POTASSIUM SERPL-SCNC: 4.6 MMOL/L (ref 3.5–5.1)
POTASSIUM SERPL-SCNC: 5.4 MMOL/L (ref 3.5–5.1)
PROT SERPL-MCNC: 6.1 G/DL (ref 5.7–8.2)
RBC # BLD AUTO: 2.76 10^6/UL (ref 4.3–6.1)
RBC # BLD AUTO: 3.34 10^6/UL (ref 4.3–6.1)
SODIUM SERPL-SCNC: 132 MMOL/L (ref 136–145)
SODIUM SERPL-SCNC: 134 MMOL/L (ref 136–145)
SODIUM SERPL-SCNC: 134 MMOL/L (ref 136–145)
TRIGL SERPL-MCNC: 246 MG/DL (ref ?–150)
WBC # BLD AUTO: 16.5 10^3/UL (ref 4–10)
WBC # BLD AUTO: 21 10^3/UL (ref 4–10)

## 2024-03-04 PROCEDURE — 30233N1 TRANSFUSION OF NONAUTOLOGOUS RED BLOOD CELLS INTO PERIPHERAL VEIN, PERCUTANEOUS APPROACH: ICD-10-PCS | Performed by: STUDENT IN AN ORGANIZED HEALTH CARE EDUCATION/TRAINING PROGRAM

## 2024-03-04 RX ADMIN — INSULIN LISPRO ONE UNITS: 100 INJECTION, SOLUTION INTRAVENOUS; SUBCUTANEOUS at 14:30

## 2024-03-04 RX ADMIN — DEXTROSE MONOHYDRATE ONE MLS/HR: 50 INJECTION, SOLUTION INTRAVENOUS at 18:00

## 2024-03-04 RX ADMIN — DEXTROSE MONOHYDRATE STA MG: 50 INJECTION, SOLUTION INTRAVENOUS at 14:29

## 2024-03-04 RX ADMIN — DEXTROSE MONOHYDRATE ONE MLS/HR: 50 INJECTION, SOLUTION INTRAVENOUS at 19:08

## 2024-03-04 RX ADMIN — SUCRALFATE SCH GM: 1 SUSPENSION ORAL at 17:59

## 2024-03-04 RX ADMIN — DEXTROSE MONOHYDRATE SCH MLS/HR: 50 INJECTION, SOLUTION INTRAVENOUS at 14:40

## 2024-03-04 RX ADMIN — INSULIN LISPRO SCH UNITS: 100 INJECTION, SOLUTION INTRAVENOUS; SUBCUTANEOUS at 09:12

## 2024-03-04 RX ADMIN — CEFEPIME HYDROCHLORIDE SCH MLS/HR: 2 INJECTION, POWDER, FOR SOLUTION INTRAVENOUS at 21:54

## 2024-03-04 RX ADMIN — DEXTROSE MONOHYDRATE SCH MLS/HR: 5 INJECTION INTRAVENOUS at 14:50

## 2024-03-04 RX ADMIN — INSULIN LISPRO SCH UNITS: 100 INJECTION, SOLUTION INTRAVENOUS; SUBCUTANEOUS at 19:13

## 2024-03-05 VITALS — SYSTOLIC BLOOD PRESSURE: 121 MMHG | DIASTOLIC BLOOD PRESSURE: 62 MMHG | OXYGEN SATURATION: 100 %

## 2024-03-05 VITALS — SYSTOLIC BLOOD PRESSURE: 131 MMHG | OXYGEN SATURATION: 97 % | DIASTOLIC BLOOD PRESSURE: 72 MMHG

## 2024-03-05 VITALS — SYSTOLIC BLOOD PRESSURE: 135 MMHG | TEMPERATURE: 97.2 F | DIASTOLIC BLOOD PRESSURE: 64 MMHG | OXYGEN SATURATION: 100 %

## 2024-03-05 VITALS — DIASTOLIC BLOOD PRESSURE: 55 MMHG | SYSTOLIC BLOOD PRESSURE: 106 MMHG | OXYGEN SATURATION: 98 %

## 2024-03-05 VITALS — OXYGEN SATURATION: 98 % | TEMPERATURE: 98.2 F | DIASTOLIC BLOOD PRESSURE: 69 MMHG | SYSTOLIC BLOOD PRESSURE: 123 MMHG

## 2024-03-05 VITALS — OXYGEN SATURATION: 97 % | SYSTOLIC BLOOD PRESSURE: 126 MMHG | DIASTOLIC BLOOD PRESSURE: 59 MMHG

## 2024-03-05 VITALS — SYSTOLIC BLOOD PRESSURE: 115 MMHG | DIASTOLIC BLOOD PRESSURE: 64 MMHG | OXYGEN SATURATION: 97 %

## 2024-03-05 VITALS — TEMPERATURE: 97.4 F | OXYGEN SATURATION: 97 % | SYSTOLIC BLOOD PRESSURE: 119 MMHG | DIASTOLIC BLOOD PRESSURE: 68 MMHG

## 2024-03-05 VITALS — SYSTOLIC BLOOD PRESSURE: 134 MMHG | HEART RATE: 124 BPM | OXYGEN SATURATION: 98 % | DIASTOLIC BLOOD PRESSURE: 60 MMHG

## 2024-03-05 VITALS — DIASTOLIC BLOOD PRESSURE: 69 MMHG | TEMPERATURE: 97.8 F | OXYGEN SATURATION: 97 % | SYSTOLIC BLOOD PRESSURE: 127 MMHG

## 2024-03-05 VITALS — SYSTOLIC BLOOD PRESSURE: 131 MMHG | DIASTOLIC BLOOD PRESSURE: 75 MMHG | OXYGEN SATURATION: 97 %

## 2024-03-05 VITALS — DIASTOLIC BLOOD PRESSURE: 63 MMHG | SYSTOLIC BLOOD PRESSURE: 108 MMHG | OXYGEN SATURATION: 95 %

## 2024-03-05 VITALS — TEMPERATURE: 97.4 F | OXYGEN SATURATION: 95 % | SYSTOLIC BLOOD PRESSURE: 114 MMHG | DIASTOLIC BLOOD PRESSURE: 71 MMHG

## 2024-03-05 VITALS — DIASTOLIC BLOOD PRESSURE: 76 MMHG | TEMPERATURE: 98.9 F | OXYGEN SATURATION: 97 % | SYSTOLIC BLOOD PRESSURE: 131 MMHG

## 2024-03-05 VITALS — OXYGEN SATURATION: 97 % | SYSTOLIC BLOOD PRESSURE: 115 MMHG | TEMPERATURE: 97.8 F | DIASTOLIC BLOOD PRESSURE: 65 MMHG

## 2024-03-05 VITALS — OXYGEN SATURATION: 98 % | SYSTOLIC BLOOD PRESSURE: 128 MMHG | DIASTOLIC BLOOD PRESSURE: 68 MMHG

## 2024-03-05 VITALS — TEMPERATURE: 97.2 F | OXYGEN SATURATION: 97 % | DIASTOLIC BLOOD PRESSURE: 74 MMHG | SYSTOLIC BLOOD PRESSURE: 110 MMHG

## 2024-03-05 VITALS — OXYGEN SATURATION: 96 % | SYSTOLIC BLOOD PRESSURE: 103 MMHG | DIASTOLIC BLOOD PRESSURE: 64 MMHG | TEMPERATURE: 97.4 F

## 2024-03-05 VITALS — TEMPERATURE: 97 F | OXYGEN SATURATION: 96 % | SYSTOLIC BLOOD PRESSURE: 120 MMHG | DIASTOLIC BLOOD PRESSURE: 58 MMHG

## 2024-03-05 VITALS — OXYGEN SATURATION: 97 % | DIASTOLIC BLOOD PRESSURE: 76 MMHG | SYSTOLIC BLOOD PRESSURE: 132 MMHG | TEMPERATURE: 99.3 F

## 2024-03-05 VITALS — SYSTOLIC BLOOD PRESSURE: 136 MMHG | DIASTOLIC BLOOD PRESSURE: 79 MMHG | OXYGEN SATURATION: 97 %

## 2024-03-05 VITALS — DIASTOLIC BLOOD PRESSURE: 70 MMHG | OXYGEN SATURATION: 99 % | SYSTOLIC BLOOD PRESSURE: 127 MMHG

## 2024-03-05 VITALS — OXYGEN SATURATION: 96 % | TEMPERATURE: 97.4 F | DIASTOLIC BLOOD PRESSURE: 85 MMHG | SYSTOLIC BLOOD PRESSURE: 144 MMHG

## 2024-03-05 VITALS — SYSTOLIC BLOOD PRESSURE: 134 MMHG | OXYGEN SATURATION: 96 % | DIASTOLIC BLOOD PRESSURE: 69 MMHG

## 2024-03-05 VITALS — DIASTOLIC BLOOD PRESSURE: 69 MMHG | SYSTOLIC BLOOD PRESSURE: 126 MMHG | OXYGEN SATURATION: 98 % | TEMPERATURE: 97.4 F

## 2024-03-05 VITALS — DIASTOLIC BLOOD PRESSURE: 71 MMHG | TEMPERATURE: 97.2 F | SYSTOLIC BLOOD PRESSURE: 123 MMHG | OXYGEN SATURATION: 97 %

## 2024-03-05 VITALS — OXYGEN SATURATION: 98 % | SYSTOLIC BLOOD PRESSURE: 114 MMHG | TEMPERATURE: 97.4 F | DIASTOLIC BLOOD PRESSURE: 71 MMHG

## 2024-03-05 VITALS — OXYGEN SATURATION: 96 % | SYSTOLIC BLOOD PRESSURE: 129 MMHG | DIASTOLIC BLOOD PRESSURE: 69 MMHG

## 2024-03-05 VITALS — OXYGEN SATURATION: 97 % | SYSTOLIC BLOOD PRESSURE: 122 MMHG | DIASTOLIC BLOOD PRESSURE: 67 MMHG

## 2024-03-05 VITALS — DIASTOLIC BLOOD PRESSURE: 85 MMHG | SYSTOLIC BLOOD PRESSURE: 138 MMHG | OXYGEN SATURATION: 97 %

## 2024-03-05 VITALS — OXYGEN SATURATION: 97 % | DIASTOLIC BLOOD PRESSURE: 61 MMHG | SYSTOLIC BLOOD PRESSURE: 119 MMHG

## 2024-03-05 VITALS — OXYGEN SATURATION: 96 % | DIASTOLIC BLOOD PRESSURE: 77 MMHG | SYSTOLIC BLOOD PRESSURE: 139 MMHG

## 2024-03-05 LAB
BASOPHILS # BLD AUTO: 0.2 10^3/UL (ref 0–0.2)
BASOPHILS NFR BLD AUTO: 1 % (ref 0–1)
BUN SERPL-MCNC: 20 MG/DL (ref 9–23)
CALCIUM SERPL-MCNC: 8 MG/DL (ref 8.5–10.1)
CHLORIDE SERPL-SCNC: 101 MMOL/L (ref 98–107)
CO2 SERPL-SCNC: 28 MMOL/L (ref 20–31)
CREAT SERPL-MCNC: 0.84 MG/DL (ref 0.7–1.3)
EOSINOPHIL # BLD AUTO: 0.4 10^3/UL (ref 0–0.5)
EOSINOPHIL NFR BLD AUTO: 2.3 % (ref 0–3)
GFR SERPL CREATININE-BSD FRML MDRD: > 60 ML/MIN/{1.73_M2} (ref 56–?)
GLUCOSE SERPL-MCNC: 239 MG/DL (ref 60–100)
HCT VFR BLD AUTO: 21.5 % (ref 42–52)
HCT VFR BLD AUTO: 24.5 % (ref 42–52)
HCT VFR BLD AUTO: 25.2 % (ref 42–52)
HGB BLD-MCNC: 7 G/DL (ref 13.5–17.5)
HGB BLD-MCNC: 8 G/DL (ref 13.5–17.5)
HGB BLD-MCNC: 8.4 G/DL (ref 13.5–17.5)
LYMPHOCYTES # BLD AUTO: 2.6 10^3/UL (ref 1.5–5)
LYMPHOCYTES NFR BLD AUTO: 13.4 % (ref 24–44)
MAGNESIUM SERPL-MCNC: 1.6 MG/DL (ref 1.8–2.4)
MCH RBC QN AUTO: 30.4 PG (ref 27–33)
MCHC RBC AUTO-ENTMCNC: 32.7 G/DL (ref 32–36.5)
MCV RBC AUTO: 93.2 FL (ref 80–96)
MONOCYTES # BLD AUTO: 0.9 10^3/UL (ref 0–0.8)
MONOCYTES NFR BLD AUTO: 4.6 % (ref 2–8)
NEUTROPHILS # BLD AUTO: 14.9 10^3/UL (ref 1.5–8.5)
NEUTROPHILS NFR BLD AUTO: 78 % (ref 36–66)
PLATELET # BLD AUTO: 307 10^3/UL (ref 150–450)
POTASSIUM SERPL-SCNC: 4.3 MMOL/L (ref 3.5–5.1)
RBC # BLD AUTO: 2.63 10^6/UL (ref 4.3–6.1)
SODIUM SERPL-SCNC: 133 MMOL/L (ref 136–145)
WBC # BLD AUTO: 19.2 10^3/UL (ref 4–10)

## 2024-03-05 RX ADMIN — SODIUM CHLORIDE, POTASSIUM CHLORIDE, SODIUM LACTATE AND CALCIUM CHLORIDE ONE ML: 600; 310; 30; 20 INJECTION, SOLUTION INTRAVENOUS at 08:42

## 2024-03-05 RX ADMIN — INSULIN DETEMIR SCH UNITS: 100 INJECTION, SOLUTION SUBCUTANEOUS at 13:08

## 2024-03-05 RX ADMIN — MAGNESIUM SULFATE IN DEXTROSE SCH MLS/HR: 10 INJECTION, SOLUTION INTRAVENOUS at 07:00

## 2024-03-05 RX ADMIN — FUROSEMIDE ONE MG: 10 INJECTION, SOLUTION INTRAMUSCULAR; INTRAVENOUS at 16:59

## 2024-03-05 RX ADMIN — HYDROMORPHONE HYDROCHLORIDE ONE MG: 1 INJECTION, SOLUTION INTRAMUSCULAR; INTRAVENOUS; SUBCUTANEOUS at 21:20

## 2024-03-05 RX ADMIN — DEXTROSE MONOHYDRATE SCH MLS/HR: 50 INJECTION, SOLUTION INTRAVENOUS at 02:43

## 2024-03-05 RX ADMIN — OCTREOTIDE ACETATE SCH MCG: 100 INJECTION, SOLUTION INTRAVENOUS; SUBCUTANEOUS at 16:18

## 2024-03-06 VITALS — OXYGEN SATURATION: 98 % | SYSTOLIC BLOOD PRESSURE: 156 MMHG | TEMPERATURE: 97.3 F | DIASTOLIC BLOOD PRESSURE: 81 MMHG

## 2024-03-06 VITALS — DIASTOLIC BLOOD PRESSURE: 85 MMHG | TEMPERATURE: 97.4 F | SYSTOLIC BLOOD PRESSURE: 153 MMHG | OXYGEN SATURATION: 99 %

## 2024-03-06 VITALS — DIASTOLIC BLOOD PRESSURE: 77 MMHG | TEMPERATURE: 97.3 F | SYSTOLIC BLOOD PRESSURE: 156 MMHG | OXYGEN SATURATION: 95 %

## 2024-03-06 VITALS — DIASTOLIC BLOOD PRESSURE: 70 MMHG | TEMPERATURE: 99 F | OXYGEN SATURATION: 96 % | SYSTOLIC BLOOD PRESSURE: 139 MMHG

## 2024-03-06 VITALS — TEMPERATURE: 97.8 F | DIASTOLIC BLOOD PRESSURE: 74 MMHG | SYSTOLIC BLOOD PRESSURE: 143 MMHG

## 2024-03-06 VITALS — TEMPERATURE: 97.6 F | OXYGEN SATURATION: 97 % | DIASTOLIC BLOOD PRESSURE: 66 MMHG | SYSTOLIC BLOOD PRESSURE: 123 MMHG

## 2024-03-06 VITALS — DIASTOLIC BLOOD PRESSURE: 73 MMHG | OXYGEN SATURATION: 98 % | TEMPERATURE: 98 F | SYSTOLIC BLOOD PRESSURE: 130 MMHG

## 2024-03-06 VITALS — DIASTOLIC BLOOD PRESSURE: 73 MMHG | OXYGEN SATURATION: 98 % | SYSTOLIC BLOOD PRESSURE: 135 MMHG

## 2024-03-06 VITALS — SYSTOLIC BLOOD PRESSURE: 150 MMHG | DIASTOLIC BLOOD PRESSURE: 76 MMHG | OXYGEN SATURATION: 98 % | TEMPERATURE: 96.9 F

## 2024-03-06 VITALS — OXYGEN SATURATION: 95 %

## 2024-03-06 VITALS — SYSTOLIC BLOOD PRESSURE: 153 MMHG | DIASTOLIC BLOOD PRESSURE: 74 MMHG | TEMPERATURE: 97.8 F

## 2024-03-06 VITALS — TEMPERATURE: 97.8 F | DIASTOLIC BLOOD PRESSURE: 74 MMHG | SYSTOLIC BLOOD PRESSURE: 153 MMHG | OXYGEN SATURATION: 96 %

## 2024-03-06 VITALS — SYSTOLIC BLOOD PRESSURE: 128 MMHG | DIASTOLIC BLOOD PRESSURE: 58 MMHG | OXYGEN SATURATION: 98 %

## 2024-03-06 LAB
BASOPHILS # BLD AUTO: 0.1 10^3/UL (ref 0–0.2)
BASOPHILS NFR BLD AUTO: 0.8 % (ref 0–1)
BUN SERPL-MCNC: 19 MG/DL (ref 9–23)
CALCIUM SERPL-MCNC: 7.3 MG/DL (ref 8.5–10.1)
CHLORIDE SERPL-SCNC: 103 MMOL/L (ref 98–107)
CO2 SERPL-SCNC: 25 MMOL/L (ref 20–31)
CREAT SERPL-MCNC: 0.84 MG/DL (ref 0.7–1.3)
EOSINOPHIL # BLD AUTO: 0.5 10^3/UL (ref 0–0.5)
EOSINOPHIL NFR BLD AUTO: 2.9 % (ref 0–3)
FERRITIN SERPL-MCNC: 548.2 NG/ML (ref 10.5–307.3)
FOLATE SERPL-MCNC: > 24 NG/ML (ref 5.4–?)
GFR SERPL CREATININE-BSD FRML MDRD: > 60 ML/MIN/{1.73_M2} (ref 56–?)
GLUCOSE SERPL-MCNC: 278 MG/DL (ref 60–100)
HCT VFR BLD AUTO: 22.3 % (ref 42–52)
HCT VFR BLD AUTO: 23.3 % (ref 42–52)
HCT VFR BLD AUTO: 23.4 % (ref 42–52)
HGB BLD-MCNC: 7.3 G/DL (ref 13.5–17.5)
HGB BLD-MCNC: 7.6 G/DL (ref 13.5–17.5)
HGB BLD-MCNC: 7.7 G/DL (ref 13.5–17.5)
IRON SATN MFR SERPL: 30 % (ref 19.7–50)
IRON SERPL-MCNC: 76 UG/DL (ref 65–175)
LYMPHOCYTES # BLD AUTO: 2.3 10^3/UL (ref 1.5–5)
LYMPHOCYTES NFR BLD AUTO: 12.4 % (ref 24–44)
MAGNESIUM SERPL-MCNC: 1.7 MG/DL (ref 1.8–2.4)
MCH RBC QN AUTO: 29.9 PG (ref 27–33)
MCH RBC QN AUTO: 30.1 PG (ref 27–33)
MCHC RBC AUTO-ENTMCNC: 32.7 G/DL (ref 32–36.5)
MCHC RBC AUTO-ENTMCNC: 32.9 G/DL (ref 32–36.5)
MCV RBC AUTO: 91.4 FL (ref 80–96)
MCV RBC AUTO: 91.4 FL (ref 80–96)
MONOCYTES # BLD AUTO: 1.1 10^3/UL (ref 0–0.8)
MONOCYTES NFR BLD AUTO: 5.9 % (ref 2–8)
NEUTROPHILS # BLD AUTO: 14.3 10^3/UL (ref 1.5–8.5)
NEUTROPHILS NFR BLD AUTO: 77.2 % (ref 36–66)
PLATELET # BLD AUTO: 219 10^3/UL (ref 150–450)
PLATELET # BLD AUTO: 248 10^3/UL (ref 150–450)
POTASSIUM SERPL-SCNC: 4.2 MMOL/L (ref 3.5–5.1)
RBC # BLD AUTO: 2.44 10^6/UL (ref 4.3–6.1)
RBC # BLD AUTO: 2.56 10^6/UL (ref 4.3–6.1)
SODIUM SERPL-SCNC: 134 MMOL/L (ref 136–145)
TIBC SERPL-MCNC: 253 UG/DL (ref 250–425)
VIT B12 SERPL-MCNC: 375 PG/ML (ref 211–911)
WBC # BLD AUTO: 15.8 10^3/UL (ref 4–10)
WBC # BLD AUTO: 18.5 10^3/UL (ref 4–10)

## 2024-03-06 RX ADMIN — INSULIN DETEMIR SCH UNITS: 100 INJECTION, SOLUTION SUBCUTANEOUS at 20:09

## 2024-03-06 RX ADMIN — SODIUM CHLORIDE SCH ML: 9 INJECTION, SOLUTION INTRAMUSCULAR; INTRAVENOUS; SUBCUTANEOUS at 06:00

## 2024-03-06 RX ADMIN — DIPHENHYDRAMINE HYDROCHLORIDE PRN MG: 25 CAPSULE ORAL at 18:12

## 2024-03-07 VITALS — DIASTOLIC BLOOD PRESSURE: 77 MMHG | SYSTOLIC BLOOD PRESSURE: 168 MMHG | OXYGEN SATURATION: 99 %

## 2024-03-07 VITALS — SYSTOLIC BLOOD PRESSURE: 152 MMHG | DIASTOLIC BLOOD PRESSURE: 72 MMHG | OXYGEN SATURATION: 97 % | TEMPERATURE: 98.1 F

## 2024-03-07 VITALS — OXYGEN SATURATION: 95 % | DIASTOLIC BLOOD PRESSURE: 70 MMHG | SYSTOLIC BLOOD PRESSURE: 136 MMHG | TEMPERATURE: 98.2 F

## 2024-03-07 VITALS — SYSTOLIC BLOOD PRESSURE: 154 MMHG | TEMPERATURE: 97.1 F | DIASTOLIC BLOOD PRESSURE: 69 MMHG | OXYGEN SATURATION: 97 %

## 2024-03-07 VITALS — SYSTOLIC BLOOD PRESSURE: 152 MMHG | TEMPERATURE: 97.5 F | DIASTOLIC BLOOD PRESSURE: 77 MMHG | OXYGEN SATURATION: 99 %

## 2024-03-07 VITALS — OXYGEN SATURATION: 95 % | DIASTOLIC BLOOD PRESSURE: 69 MMHG | TEMPERATURE: 97.5 F | SYSTOLIC BLOOD PRESSURE: 151 MMHG

## 2024-03-07 VITALS — SYSTOLIC BLOOD PRESSURE: 166 MMHG | DIASTOLIC BLOOD PRESSURE: 78 MMHG | TEMPERATURE: 98.7 F | OXYGEN SATURATION: 97 %

## 2024-03-07 LAB
BUN SERPL-MCNC: 9 MG/DL (ref 9–23)
CALCIUM SERPL-MCNC: 7.5 MG/DL (ref 8.5–10.1)
CHLORIDE SERPL-SCNC: 103 MMOL/L (ref 98–107)
CO2 SERPL-SCNC: 29 MMOL/L (ref 20–31)
CREAT SERPL-MCNC: 0.83 MG/DL (ref 0.7–1.3)
GFR SERPL CREATININE-BSD FRML MDRD: > 60 ML/MIN/{1.73_M2} (ref 56–?)
GLUCOSE SERPL-MCNC: 211 MG/DL (ref 60–100)
HCT VFR BLD AUTO: 23.1 % (ref 42–52)
HGB BLD-MCNC: 7.5 G/DL (ref 13.5–17.5)
MAGNESIUM SERPL-MCNC: 1.9 MG/DL (ref 1.8–2.4)
MCH RBC QN AUTO: 30.2 PG (ref 27–33)
MCHC RBC AUTO-ENTMCNC: 32.5 G/DL (ref 32–36.5)
MCV RBC AUTO: 93.1 FL (ref 80–96)
PHOSPHATE SERPL-MCNC: 2.9 MG/DL (ref 2.5–4.9)
PLATELET # BLD AUTO: 219 10^3/UL (ref 150–450)
POTASSIUM SERPL-SCNC: 4 MMOL/L (ref 3.5–5.1)
RBC # BLD AUTO: 2.48 10^6/UL (ref 4.3–6.1)
SODIUM SERPL-SCNC: 135 MMOL/L (ref 136–145)
WBC # BLD AUTO: 15.5 10^3/UL (ref 4–10)

## 2024-03-07 PROCEDURE — 0DJ08ZZ INSPECTION OF UPPER INTESTINAL TRACT, VIA NATURAL OR ARTIFICIAL OPENING ENDOSCOPIC: ICD-10-PCS | Performed by: SURGERY

## 2024-03-08 VITALS — TEMPERATURE: 98.4 F | DIASTOLIC BLOOD PRESSURE: 74 MMHG | OXYGEN SATURATION: 98 % | SYSTOLIC BLOOD PRESSURE: 169 MMHG

## 2024-03-08 VITALS — OXYGEN SATURATION: 99 % | DIASTOLIC BLOOD PRESSURE: 71 MMHG | TEMPERATURE: 98.3 F | SYSTOLIC BLOOD PRESSURE: 156 MMHG

## 2024-03-08 VITALS — DIASTOLIC BLOOD PRESSURE: 72 MMHG | OXYGEN SATURATION: 98 % | SYSTOLIC BLOOD PRESSURE: 150 MMHG | TEMPERATURE: 97.9 F

## 2024-03-08 VITALS — DIASTOLIC BLOOD PRESSURE: 74 MMHG | SYSTOLIC BLOOD PRESSURE: 144 MMHG | TEMPERATURE: 97.9 F | OXYGEN SATURATION: 97 %

## 2024-03-08 VITALS — TEMPERATURE: 97.8 F | SYSTOLIC BLOOD PRESSURE: 153 MMHG | OXYGEN SATURATION: 98 % | DIASTOLIC BLOOD PRESSURE: 82 MMHG

## 2024-03-08 VITALS — DIASTOLIC BLOOD PRESSURE: 72 MMHG | TEMPERATURE: 97.9 F | OXYGEN SATURATION: 100 % | SYSTOLIC BLOOD PRESSURE: 149 MMHG

## 2024-03-08 VITALS — SYSTOLIC BLOOD PRESSURE: 142 MMHG | OXYGEN SATURATION: 93 % | DIASTOLIC BLOOD PRESSURE: 72 MMHG | TEMPERATURE: 98.2 F

## 2024-03-08 VITALS — SYSTOLIC BLOOD PRESSURE: 148 MMHG | TEMPERATURE: 97.2 F | DIASTOLIC BLOOD PRESSURE: 72 MMHG

## 2024-03-08 VITALS — SYSTOLIC BLOOD PRESSURE: 158 MMHG | OXYGEN SATURATION: 97 % | TEMPERATURE: 99.2 F | DIASTOLIC BLOOD PRESSURE: 72 MMHG

## 2024-03-08 VITALS — SYSTOLIC BLOOD PRESSURE: 152 MMHG | DIASTOLIC BLOOD PRESSURE: 72 MMHG | OXYGEN SATURATION: 95 % | TEMPERATURE: 98.9 F

## 2024-03-08 VITALS — DIASTOLIC BLOOD PRESSURE: 77 MMHG | OXYGEN SATURATION: 98 % | SYSTOLIC BLOOD PRESSURE: 161 MMHG | TEMPERATURE: 98.4 F

## 2024-03-08 LAB
ALBUMIN SERPL BCG-MCNC: 2 G/DL (ref 3.2–5.2)
ALP SERPL-CCNC: 75 U/L (ref 46–116)
ALT SERPL W P-5'-P-CCNC: 9 U/L (ref 7–40)
AST SERPL-CCNC: 13 U/L (ref ?–34)
BILIRUB SERPL-MCNC: 0.5 MG/DL (ref 0.3–1.2)
BUN SERPL-MCNC: 9 MG/DL (ref 9–23)
CALCIUM SERPL-MCNC: 7.9 MG/DL (ref 8.5–10.1)
CHLORIDE SERPL-SCNC: 104 MMOL/L (ref 98–107)
CO2 SERPL-SCNC: 32 MMOL/L (ref 20–31)
CREAT SERPL-MCNC: 0.79 MG/DL (ref 0.7–1.3)
GFR SERPL CREATININE-BSD FRML MDRD: > 60 ML/MIN/{1.73_M2} (ref 56–?)
GLUCOSE SERPL-MCNC: 151 MG/DL (ref 60–100)
HCT VFR BLD AUTO: 21.3 % (ref 42–52)
HCT VFR BLD AUTO: 25.7 % (ref 42–52)
HGB BLD-MCNC: 6.9 G/DL (ref 13.5–17.5)
HGB BLD-MCNC: 8.3 G/DL (ref 13.5–17.5)
MCH RBC QN AUTO: 30.8 PG (ref 27–33)
MCHC RBC AUTO-ENTMCNC: 32.4 G/DL (ref 32–36.5)
MCV RBC AUTO: 95.1 FL (ref 80–96)
PLATELET # BLD AUTO: 206 10^3/UL (ref 150–450)
POTASSIUM SERPL-SCNC: 3.7 MMOL/L (ref 3.5–5.1)
PROT SERPL-MCNC: 5.6 G/DL (ref 5.7–8.2)
RBC # BLD AUTO: 2.24 10^6/UL (ref 4.3–6.1)
SODIUM SERPL-SCNC: 136 MMOL/L (ref 136–145)
WBC # BLD AUTO: 11.7 10^3/UL (ref 4–10)

## 2024-03-08 RX ADMIN — SENNOSIDES SCH TAB: 8.6 TABLET, FILM COATED ORAL at 12:50

## 2024-03-08 RX ADMIN — POLYETHYLENE GLYCOL 3350, SODIUM SULFATE ANHYDROUS, SODIUM BICARBONATE, SODIUM CHLORIDE, POTASSIUM CHLORIDE ONE ML: 236; 22.74; 6.74; 5.86; 2.97 POWDER, FOR SOLUTION ORAL at 17:52

## 2024-03-08 RX ADMIN — MAGNESIUM HYDROXIDE SCH ML: 400 SUSPENSION ORAL at 12:00

## 2024-03-08 RX ADMIN — DOCUSATE SODIUM SCH MG: 100 CAPSULE, LIQUID FILLED ORAL at 12:00

## 2024-03-08 RX ADMIN — FUROSEMIDE ONE MG: 10 INJECTION, SOLUTION INTRAMUSCULAR; INTRAVENOUS at 11:56

## 2024-03-08 RX ADMIN — AMOXICILLIN AND CLAVULANATE POTASSIUM SCH MG: 875; 125 TABLET, FILM COATED ORAL at 15:46

## 2024-03-09 VITALS — OXYGEN SATURATION: 98 % | SYSTOLIC BLOOD PRESSURE: 135 MMHG | TEMPERATURE: 98.3 F | DIASTOLIC BLOOD PRESSURE: 67 MMHG

## 2024-03-09 VITALS — SYSTOLIC BLOOD PRESSURE: 136 MMHG | DIASTOLIC BLOOD PRESSURE: 64 MMHG | OXYGEN SATURATION: 96 % | TEMPERATURE: 97.9 F

## 2024-03-09 VITALS — TEMPERATURE: 97.8 F | SYSTOLIC BLOOD PRESSURE: 151 MMHG | OXYGEN SATURATION: 97 % | DIASTOLIC BLOOD PRESSURE: 86 MMHG

## 2024-03-09 VITALS — DIASTOLIC BLOOD PRESSURE: 78 MMHG | TEMPERATURE: 97.4 F | OXYGEN SATURATION: 97 % | SYSTOLIC BLOOD PRESSURE: 154 MMHG

## 2024-03-09 VITALS — DIASTOLIC BLOOD PRESSURE: 81 MMHG | SYSTOLIC BLOOD PRESSURE: 168 MMHG | OXYGEN SATURATION: 98 % | TEMPERATURE: 97.2 F

## 2024-03-09 VITALS — SYSTOLIC BLOOD PRESSURE: 153 MMHG | OXYGEN SATURATION: 99 % | DIASTOLIC BLOOD PRESSURE: 77 MMHG | TEMPERATURE: 97 F

## 2024-03-09 VITALS — OXYGEN SATURATION: 97 %

## 2024-03-09 LAB
BUN SERPL-MCNC: 8 MG/DL (ref 9–23)
CALCIUM SERPL-MCNC: 7.9 MG/DL (ref 8.5–10.1)
CHLORIDE SERPL-SCNC: 104 MMOL/L (ref 98–107)
CO2 SERPL-SCNC: 32 MMOL/L (ref 20–31)
CREAT SERPL-MCNC: 0.81 MG/DL (ref 0.7–1.3)
GFR SERPL CREATININE-BSD FRML MDRD: > 60 ML/MIN/{1.73_M2} (ref 56–?)
GLUCOSE SERPL-MCNC: 83 MG/DL (ref 60–100)
HCT VFR BLD AUTO: 26.6 % (ref 42–52)
HGB BLD-MCNC: 8.8 G/DL (ref 13.5–17.5)
MCH RBC QN AUTO: 30.4 PG (ref 27–33)
MCHC RBC AUTO-ENTMCNC: 33.1 G/DL (ref 32–36.5)
MCV RBC AUTO: 92 FL (ref 80–96)
PLATELET # BLD AUTO: 231 10^3/UL (ref 150–450)
POTASSIUM SERPL-SCNC: 3.4 MMOL/L (ref 3.5–5.1)
RBC # BLD AUTO: 2.89 10^6/UL (ref 4.3–6.1)
SODIUM SERPL-SCNC: 141 MMOL/L (ref 136–145)
WBC # BLD AUTO: 12.1 10^3/UL (ref 4–10)

## 2024-03-09 PROCEDURE — 0DJD8ZZ INSPECTION OF LOWER INTESTINAL TRACT, VIA NATURAL OR ARTIFICIAL OPENING ENDOSCOPIC: ICD-10-PCS | Performed by: SURGERY

## 2024-03-09 RX ADMIN — INSULIN LISPRO SCH UNITS: 100 INJECTION, SOLUTION INTRAVENOUS; SUBCUTANEOUS at 18:38

## 2024-03-09 RX ADMIN — POTASSIUM CHLORIDE ONE MEQ: 750 TABLET, EXTENDED RELEASE ORAL at 09:29

## 2024-03-09 RX ADMIN — PANTOPRAZOLE SODIUM SCH MG: 40 TABLET, DELAYED RELEASE ORAL at 09:29

## 2024-03-09 RX ADMIN — INSULIN LISPRO SCH UNITS: 100 INJECTION, SOLUTION INTRAVENOUS; SUBCUTANEOUS at 20:11

## 2024-03-10 VITALS — SYSTOLIC BLOOD PRESSURE: 151 MMHG | OXYGEN SATURATION: 97 % | DIASTOLIC BLOOD PRESSURE: 81 MMHG | TEMPERATURE: 98.1 F

## 2024-03-10 VITALS — SYSTOLIC BLOOD PRESSURE: 143 MMHG | TEMPERATURE: 98.3 F | OXYGEN SATURATION: 95 % | DIASTOLIC BLOOD PRESSURE: 65 MMHG

## 2024-03-10 VITALS — TEMPERATURE: 98.5 F | DIASTOLIC BLOOD PRESSURE: 59 MMHG | SYSTOLIC BLOOD PRESSURE: 116 MMHG | OXYGEN SATURATION: 92 %

## 2024-03-10 VITALS — SYSTOLIC BLOOD PRESSURE: 108 MMHG | OXYGEN SATURATION: 96 % | DIASTOLIC BLOOD PRESSURE: 65 MMHG | TEMPERATURE: 97.8 F

## 2024-03-10 VITALS — TEMPERATURE: 97.3 F | SYSTOLIC BLOOD PRESSURE: 142 MMHG | DIASTOLIC BLOOD PRESSURE: 74 MMHG | OXYGEN SATURATION: 98 %

## 2024-03-10 VITALS — SYSTOLIC BLOOD PRESSURE: 111 MMHG | DIASTOLIC BLOOD PRESSURE: 55 MMHG | TEMPERATURE: 97.8 F | OXYGEN SATURATION: 96 %

## 2024-03-10 VITALS — SYSTOLIC BLOOD PRESSURE: 110 MMHG | DIASTOLIC BLOOD PRESSURE: 56 MMHG | OXYGEN SATURATION: 94 % | TEMPERATURE: 97.6 F

## 2024-03-10 LAB
HCT VFR BLD AUTO: 25.9 % (ref 42–52)
HGB BLD-MCNC: 8.4 G/DL (ref 13.5–17.5)
MCH RBC QN AUTO: 30.2 PG (ref 27–33)
MCHC RBC AUTO-ENTMCNC: 32.4 G/DL (ref 32–36.5)
MCV RBC AUTO: 93.2 FL (ref 80–96)
PLATELET # BLD AUTO: 238 10^3/UL (ref 150–450)
RBC # BLD AUTO: 2.78 10^6/UL (ref 4.3–6.1)
WBC # BLD AUTO: 11.1 10^3/UL (ref 4–10)

## 2024-03-11 VITALS — OXYGEN SATURATION: 96 % | DIASTOLIC BLOOD PRESSURE: 72 MMHG | SYSTOLIC BLOOD PRESSURE: 136 MMHG | TEMPERATURE: 98 F

## 2024-03-11 VITALS — SYSTOLIC BLOOD PRESSURE: 114 MMHG | TEMPERATURE: 97.1 F | OXYGEN SATURATION: 94 % | DIASTOLIC BLOOD PRESSURE: 73 MMHG

## 2024-03-11 VITALS — OXYGEN SATURATION: 97 % | TEMPERATURE: 97 F | SYSTOLIC BLOOD PRESSURE: 131 MMHG | DIASTOLIC BLOOD PRESSURE: 73 MMHG

## 2024-03-11 VITALS — TEMPERATURE: 98.6 F | SYSTOLIC BLOOD PRESSURE: 123 MMHG | DIASTOLIC BLOOD PRESSURE: 65 MMHG | OXYGEN SATURATION: 96 %

## 2024-03-11 VITALS — OXYGEN SATURATION: 99 % | SYSTOLIC BLOOD PRESSURE: 157 MMHG | DIASTOLIC BLOOD PRESSURE: 82 MMHG | TEMPERATURE: 98.1 F

## 2024-03-11 LAB
ALBUMIN SERPL BCG-MCNC: 2 G/DL (ref 3.2–5.2)
ALP SERPL-CCNC: 108 U/L (ref 46–116)
ALT SERPL W P-5'-P-CCNC: 10 U/L (ref 7–40)
AST SERPL-CCNC: 14 U/L (ref ?–34)
BILIRUB SERPL-MCNC: 0.5 MG/DL (ref 0.3–1.2)
BUN SERPL-MCNC: 8 MG/DL (ref 9–23)
CALCIUM SERPL-MCNC: 8 MG/DL (ref 8.5–10.1)
CHLORIDE SERPL-SCNC: 105 MMOL/L (ref 98–107)
CO2 SERPL-SCNC: 30 MMOL/L (ref 20–31)
CREAT SERPL-MCNC: 0.78 MG/DL (ref 0.7–1.3)
GFR SERPL CREATININE-BSD FRML MDRD: > 60 ML/MIN/{1.73_M2} (ref 56–?)
GLUCOSE SERPL-MCNC: 122 MG/DL (ref 60–100)
HCT VFR BLD AUTO: 25.7 % (ref 42–52)
HGB BLD-MCNC: 8.2 G/DL (ref 13.5–17.5)
MAGNESIUM SERPL-MCNC: 1.7 MG/DL (ref 1.8–2.4)
MCH RBC QN AUTO: 30 PG (ref 27–33)
MCHC RBC AUTO-ENTMCNC: 31.9 G/DL (ref 32–36.5)
MCV RBC AUTO: 94.1 FL (ref 80–96)
PHOSPHATE SERPL-MCNC: 3.5 MG/DL (ref 2.5–4.9)
PLATELET # BLD AUTO: 222 10^3/UL (ref 150–450)
POTASSIUM SERPL-SCNC: 3.7 MMOL/L (ref 3.5–5.1)
PROT SERPL-MCNC: 5.8 G/DL (ref 5.7–8.2)
RBC # BLD AUTO: 2.73 10^6/UL (ref 4.3–6.1)
SODIUM SERPL-SCNC: 140 MMOL/L (ref 136–145)
WBC # BLD AUTO: 8.1 10^3/UL (ref 4–10)

## 2024-03-11 RX ADMIN — MAGNESIUM OXIDE SCH MG: 400 TABLET ORAL at 06:38

## 2024-03-12 VITALS — TEMPERATURE: 97.9 F | SYSTOLIC BLOOD PRESSURE: 133 MMHG | DIASTOLIC BLOOD PRESSURE: 66 MMHG | OXYGEN SATURATION: 92 %

## 2024-03-12 VITALS — SYSTOLIC BLOOD PRESSURE: 143 MMHG | OXYGEN SATURATION: 94 % | TEMPERATURE: 97.9 F | DIASTOLIC BLOOD PRESSURE: 75 MMHG

## 2024-03-12 VITALS — SYSTOLIC BLOOD PRESSURE: 143 MMHG | DIASTOLIC BLOOD PRESSURE: 75 MMHG

## 2024-03-12 VITALS — OXYGEN SATURATION: 98 % | SYSTOLIC BLOOD PRESSURE: 129 MMHG | TEMPERATURE: 98.5 F | DIASTOLIC BLOOD PRESSURE: 60 MMHG

## 2024-03-12 RX ADMIN — BACITRACIN ZINC, NEOMYCIN, POLYMYXIN B ONE DOSE: 400; 3.5; 5 OINTMENT TOPICAL at 17:57

## 2024-03-22 ENCOUNTER — HOSPITAL ENCOUNTER (OUTPATIENT)
Dept: HOSPITAL 53 - M LAB | Age: 59
End: 2024-03-22
Attending: REGISTERED NURSE
Payer: COMMERCIAL

## 2024-03-22 DIAGNOSIS — K92.2: Primary | ICD-10-CM

## 2024-03-22 LAB
HCT VFR BLD AUTO: 37.7 % (ref 42–52)
HGB BLD-MCNC: 11.5 G/DL (ref 13.5–17.5)
MCH RBC QN AUTO: 28.5 PG (ref 27–33)
MCHC RBC AUTO-ENTMCNC: 30.5 G/DL (ref 32–36.5)
MCV RBC AUTO: 93.3 FL (ref 80–96)
PLATELET # BLD AUTO: 439 10^3/UL (ref 150–450)
RBC # BLD AUTO: 4.04 10^6/UL (ref 4.3–6.1)
WBC # BLD AUTO: 12.7 10^3/UL (ref 4–10)

## 2024-08-23 ENCOUNTER — HOSPITAL ENCOUNTER (OUTPATIENT)
Dept: HOSPITAL 53 - M LAB | Age: 59
End: 2024-08-23
Attending: REGISTERED NURSE
Payer: COMMERCIAL

## 2024-08-23 DIAGNOSIS — I10: ICD-10-CM

## 2024-08-23 DIAGNOSIS — E11.628: Primary | ICD-10-CM

## 2024-08-23 LAB
ALBUMIN SERPL BCG-MCNC: 3.4 G/DL (ref 3.2–5.2)
ALP SERPL-CCNC: 108 U/L (ref 46–116)
ALT SERPL W P-5'-P-CCNC: 23 U/L (ref 7–40)
AST SERPL-CCNC: 15 U/L (ref ?–34)
BILIRUB SERPL-MCNC: 0.5 MG/DL (ref 0.3–1.2)
BUN SERPL-MCNC: 14 MG/DL (ref 9–23)
CALCIUM SERPL-MCNC: 9.4 MG/DL (ref 8.5–10.1)
CHLORIDE SERPL-SCNC: 106 MMOL/L (ref 98–107)
CO2 SERPL-SCNC: 26 MMOL/L (ref 20–31)
CREAT SERPL-MCNC: 0.84 MG/DL (ref 0.7–1.3)
EST. AVERAGE GLUCOSE BLD GHB EST-MCNC: 186 MG/DL (ref 60–110)
GFR SERPL CREATININE-BSD FRML MDRD: > 60 ML/MIN/{1.73_M2} (ref 56–?)
GLUCOSE SERPL-MCNC: 235 MG/DL (ref 60–100)
POTASSIUM SERPL-SCNC: 4.2 MMOL/L (ref 3.5–5.1)
PROT SERPL-MCNC: 7.3 G/DL (ref 5.7–8.2)
SODIUM SERPL-SCNC: 139 MMOL/L (ref 136–145)

## 2024-10-21 ENCOUNTER — HOSPITAL ENCOUNTER (OUTPATIENT)
Dept: HOSPITAL 53 - M LAB REF | Age: 59
End: 2024-10-21
Attending: REGISTERED NURSE
Payer: COMMERCIAL

## 2024-10-21 ENCOUNTER — HOSPITAL ENCOUNTER (OUTPATIENT)
Dept: HOSPITAL 53 - M RAD | Age: 59
End: 2024-10-21
Attending: REGISTERED NURSE
Payer: COMMERCIAL

## 2024-10-21 DIAGNOSIS — Z12.5: ICD-10-CM

## 2024-10-21 DIAGNOSIS — R05.3: Primary | ICD-10-CM

## 2024-10-21 DIAGNOSIS — E66.9: ICD-10-CM

## 2024-10-21 DIAGNOSIS — I10: ICD-10-CM

## 2024-10-21 DIAGNOSIS — E11.9: ICD-10-CM

## 2024-10-21 DIAGNOSIS — E78.2: Primary | ICD-10-CM

## 2024-10-21 LAB
ALBUMIN SERPL BCG-MCNC: 3.3 G/DL (ref 3.2–5.2)
ALP SERPL-CCNC: 100 U/L (ref 46–116)
ALT SERPL W P-5'-P-CCNC: 24 U/L (ref 7–40)
APPEARANCE UR: CLEAR
AST SERPL-CCNC: 16 U/L (ref ?–34)
BACTERIA UR QL AUTO: NEGATIVE
BILIRUB SERPL-MCNC: 0.6 MG/DL (ref 0.3–1.2)
BILIRUB UR QL STRIP.AUTO: NEGATIVE
BUN SERPL-MCNC: 13 MG/DL (ref 9–23)
CALCIUM SERPL-MCNC: 9.8 MG/DL (ref 8.3–10.6)
CHLORIDE SERPL-SCNC: 107 MMOL/L (ref 98–107)
CHOLEST SERPL-MCNC: 205 MG/DL (ref ?–200)
CHOLEST/HDLC SERPL: 4.8 {RATIO} (ref ?–5)
CO2 SERPL-SCNC: 26 MMOL/L (ref 20–31)
CREAT SERPL-MCNC: 0.81 MG/DL (ref 0.7–1.3)
CREAT UR-MCNC: 60.1 MG/DL
EST. AVERAGE GLUCOSE BLD GHB EST-MCNC: 197 MG/DL (ref 60–110)
GFR SERPL CREATININE-BSD FRML MDRD: > 60 ML/MIN/{1.73_M2} (ref 49–?)
GLUCOSE SERPL-MCNC: 246 MG/DL (ref 74–106)
GLUCOSE UR QL STRIP.AUTO: (no result) MG/DL
HCT VFR BLD AUTO: 45.9 % (ref 42–52)
HDLC SERPL-MCNC: 42.7 MG/DL (ref 40–?)
HGB BLD-MCNC: 15 G/DL (ref 13.5–17.5)
HGB UR QL STRIP.AUTO: NEGATIVE
KETONES UR QL STRIP.AUTO: NEGATIVE MG/DL
LDLC SERPL CALC-MCNC: 120.5 MG/DL (ref ?–100)
LEUKOCYTE ESTERASE UR QL STRIP.AUTO: NEGATIVE
MCH RBC QN AUTO: 29.7 PG (ref 27–33)
MCHC RBC AUTO-ENTMCNC: 32.7 G/DL (ref 32–36.5)
MCV RBC AUTO: 90.9 FL (ref 80–96)
MICROALBUMIN UR-MCNC: 153 MG/L
MICROALBUMIN/CREAT UR: 254.5 MCG/MG (ref 0–30)
MUCOUS THREADS URNS QL MICRO: (no result)
NITRITE UR QL STRIP.AUTO: NEGATIVE
NONHDLC SERPL-MCNC: 162.3 MG/DL
PH UR STRIP.AUTO: 5 UNITS (ref 5–9)
PLATELET # BLD AUTO: 175 10^3/UL (ref 150–450)
POTASSIUM SERPL-SCNC: 4.3 MMOL/L (ref 3.5–5.1)
PROT SERPL-MCNC: 7 G/DL (ref 5.7–8.2)
PROT UR QL STRIP.AUTO: (no result) MG/DL
PSA SERPL-MCNC: 0.32 NG/ML (ref ?–4)
RBC # BLD AUTO: 5.05 10^6/UL (ref 4.3–6.1)
RBC # UR AUTO: 0 /HPF (ref 0–3)
SODIUM SERPL-SCNC: 139 MMOL/L (ref 136–145)
SP GR UR STRIP.AUTO: 1.02 (ref 1–1.03)
SQUAMOUS #/AREA URNS AUTO: 0 /HPF (ref 0–6)
T3FREE SERPL-MCNC: 3.3 PG/ML (ref 2.3–4.2)
T4 FREE SERPL-MCNC: 1.08 NG/DL (ref 0.89–1.76)
TRIGL SERPL-MCNC: 209 MG/DL (ref ?–150)
TSH SERPL DL<=0.005 MIU/L-ACNC: 1.28 UIU/ML (ref 0.55–4.78)
URATE SERPL-MCNC: 5.7 MG/DL (ref 3.7–9.2)
UROBILINOGEN UR QL STRIP.AUTO: 0.2 MG/DL (ref 0–2)
VIT B12 SERPL-MCNC: 372 PG/ML (ref 211–911)
WBC # BLD AUTO: 6.3 10^3/UL (ref 4–10)
WBC #/AREA URNS AUTO: 0 /HPF (ref 0–3)

## 2024-10-21 PROCEDURE — 84550 ASSAY OF BLOOD/URIC ACID: CPT

## 2024-10-21 PROCEDURE — 83036 HEMOGLOBIN GLYCOSYLATED A1C: CPT

## 2024-10-21 PROCEDURE — 84439 ASSAY OF FREE THYROXINE: CPT

## 2024-10-21 PROCEDURE — 82043 UR ALBUMIN QUANTITATIVE: CPT

## 2024-10-21 PROCEDURE — 84443 ASSAY THYROID STIM HORMONE: CPT

## 2024-10-21 PROCEDURE — 80053 COMPREHEN METABOLIC PANEL: CPT

## 2024-10-21 PROCEDURE — 82607 VITAMIN B-12: CPT

## 2024-10-21 PROCEDURE — 80061 LIPID PANEL: CPT

## 2024-10-21 PROCEDURE — 85027 COMPLETE CBC AUTOMATED: CPT

## 2024-10-21 PROCEDURE — 81001 URINALYSIS AUTO W/SCOPE: CPT

## 2024-10-21 PROCEDURE — 84481 FREE ASSAY (FT-3): CPT

## 2024-11-20 ENCOUNTER — HOSPITAL ENCOUNTER (OUTPATIENT)
Dept: HOSPITAL 53 - M RAD | Age: 59
End: 2024-11-20
Attending: INTERNAL MEDICINE
Payer: COMMERCIAL

## 2024-11-20 DIAGNOSIS — R91.8: Primary | ICD-10-CM

## 2025-01-28 ENCOUNTER — HOSPITAL ENCOUNTER (OUTPATIENT)
Dept: HOSPITAL 53 - M RAD | Age: 60
End: 2025-01-28
Attending: INTERNAL MEDICINE
Payer: COMMERCIAL

## 2025-01-28 DIAGNOSIS — C78.02: ICD-10-CM

## 2025-01-28 DIAGNOSIS — Z90.2: ICD-10-CM

## 2025-01-28 DIAGNOSIS — C18.9: Primary | ICD-10-CM

## 2025-01-28 PROCEDURE — 71260 CT THORAX DX C+: CPT

## 2025-01-28 PROCEDURE — 74177 CT ABD & PELVIS W/CONTRAST: CPT

## 2025-04-29 ENCOUNTER — HOSPITAL ENCOUNTER (OUTPATIENT)
Dept: HOSPITAL 53 - M RAD | Age: 60
End: 2025-04-29
Attending: NURSE PRACTITIONER
Payer: COMMERCIAL

## 2025-04-29 DIAGNOSIS — C18.4: Primary | ICD-10-CM

## 2025-04-29 PROCEDURE — 71260 CT THORAX DX C+: CPT

## 2025-04-29 PROCEDURE — 74177 CT ABD & PELVIS W/CONTRAST: CPT
